# Patient Record
Sex: FEMALE | Race: WHITE | NOT HISPANIC OR LATINO | Employment: FULL TIME | URBAN - METROPOLITAN AREA
[De-identification: names, ages, dates, MRNs, and addresses within clinical notes are randomized per-mention and may not be internally consistent; named-entity substitution may affect disease eponyms.]

---

## 2017-01-06 ENCOUNTER — OFFICE VISIT (OUTPATIENT)
Dept: NEUROLOGY | Facility: CLINIC | Age: 37
End: 2017-01-06
Attending: NURSE PRACTITIONER
Payer: COMMERCIAL

## 2017-01-06 VITALS
BODY MASS INDEX: 26.31 KG/M2 | HEART RATE: 79 BPM | RESPIRATION RATE: 16 BRPM | OXYGEN SATURATION: 96 % | SYSTOLIC BLOOD PRESSURE: 86 MMHG | DIASTOLIC BLOOD PRESSURE: 60 MMHG | WEIGHT: 143 LBS | HEIGHT: 62 IN | TEMPERATURE: 97 F

## 2017-01-06 DIAGNOSIS — E56.9 VITAMIN DEFICIENCY: ICD-10-CM

## 2017-01-06 DIAGNOSIS — R42 DIZZINESS: Primary | ICD-10-CM

## 2017-01-06 LAB
DEPRECATED CALCIDIOL+CALCIFEROL SERPL-MC: 24 UG/L (ref 20–75)
VIT B12 SERPL-MCNC: 522 PG/ML (ref 193–986)

## 2017-01-06 PROCEDURE — 82607 VITAMIN B-12: CPT | Mod: 90 | Performed by: PSYCHIATRY & NEUROLOGY

## 2017-01-06 PROCEDURE — 83921 ORGANIC ACID SINGLE QUANT: CPT | Mod: 90 | Performed by: PSYCHIATRY & NEUROLOGY

## 2017-01-06 PROCEDURE — 99000 SPECIMEN HANDLING OFFICE-LAB: CPT | Performed by: PSYCHIATRY & NEUROLOGY

## 2017-01-06 PROCEDURE — 82306 VITAMIN D 25 HYDROXY: CPT | Mod: 90 | Performed by: PSYCHIATRY & NEUROLOGY

## 2017-01-06 PROCEDURE — 36415 COLL VENOUS BLD VENIPUNCTURE: CPT | Performed by: PSYCHIATRY & NEUROLOGY

## 2017-01-06 PROCEDURE — 99205 OFFICE O/P NEW HI 60 MIN: CPT | Performed by: PSYCHIATRY & NEUROLOGY

## 2017-01-06 NOTE — MR AVS SNAPSHOT
After Visit Summary   1/6/2017    Brianna Han    MRN: [id number]           Patient Information     Date Of Birth          1980        Visit Information        Provider Department      1/6/2017 8:00 AM Thang Emerson MD [facility]        Today's Diagnoses     Dizziness    -  1     Vitamin deficiency           Care Instructions    AFTER VISIT SUMMARY (AVS):    At today's visit we discussed various diagnostic possibilities for dizziness and the reasons for work-up, which includes:  Orders Placed This Encounter   Procedures     Vitamin B12     Vitamin D Deficiency     Methylmalonic acid     CARDIOLOGY EVAL ADULT REFERRAL     No new medications were ordered.    Preventive Neurology: Encouraged to stay physically and mentally active with particular emphasis on daily mentally stimulating activities of your choice (such as crosswords, puzzles, sudoku, etc.), stretching exercises, walking, and healthy eating.    Next follow-up appointment is on as needed basis.    Please do not hesitate to call me with any questions or concerns.    Thanks          Follow-ups after your visit        Additional Services     CARDIOLOGY EVAL ADULT REFERRAL       Your provider has referred you to:  [facility] [phone]   [email or web address]    Please be aware that coverage of these services is subject to the terms and limitations of your health insurance plan.  Call member services at your health plan with any benefit or coverage questions.      Type of Referral:  New Cardiology Consult. Family history of cardiac arrhthymias and episodic dizziness with running.     Timeframe requested:  Within 1 month    Please bring the following to your appointment:  >>   Any x-rays, CTs or MRIs which have been performed.  Contact the facility where they were done to arrange for  prior to your scheduled  "appointment.    >>   List of current medications  >>   This referral request   >>   Any documents/labs given to you for this referral                  Who to contact     If you have questions or need follow up information about today's clinic visit or your schedule please contact [facility] directly at [phone].  Normal or non-critical lab and imaging results will be communicated to you by MyChart, letter or phone within 4 business days after the clinic has received the results. If you do not hear from us within 7 days, please contact the clinic through [email or web address] or phone. If you have a critical or abnormal lab result, we will notify you by phone as soon as possible.  Submit refill requests through [employer] or call your pharmacy and they will forward the refill request to us. Please allow 3 business days for your refill to be completed.          Additional Information About Your Visit        MyChart Information     [employer] gives you secure access to your electronic health record. If you see a primary care provider, you can also send messages to your care team and make appointments. If you have questions, please call your primary care clinic.  If you do not have a primary care provider, please call [phone] and they will assist you.        Care EveryWhere ID     This is your Care EveryWhere ID. This could be used by other organizations to access your [facility] medical records  [id number]        Your Vitals Were     Pulse Temperature Respirations    79 97  F (36.1  C) (Tympanic) 16    Height BMI (Body Mass Index) Pulse Oximetry    1.57 m (5' 1.8\") 26.32 kg/m2 96%    Last Period          12/25/2016 (Exact Date)         Blood Pressure from Last 3 Encounters:   01/06/17 86/60   11/09/16 92/60   02/08/16 98/56    Weight from Last 3 Encounters:   01/06/17 64.864 kg (143 lb)   11/09/16 64.411 kg (142 lb)   02/08/16 65.499 kg (144 lb 6.4 oz)              We Performed the Following     CARDIOLOGY " [facility] ADULT REFERRAL     Methylmalonic acid     Vitamin B12     Vitamin D Deficiency        Primary Care Provider Office Phone # Fax #    LAYNE Johnson JORGE [phone]       [facility] [address] [place] [address]        Thank you!     Thank you for choosing [facility]  for your care. Our goal is always to provide you with excellent care. Hearing back from our patients is one way we can continue to improve our services. Please take a few minutes to complete the written survey that you may receive in the mail after your visit with us. Thank you!             Your Updated Medication List - Protect others around you: Learn how to safely use, store and throw away your medicines at www.disposemymeds.org.          This list is accurate as of: 1/6/17  9:14 AM.  Always use your most recent med list.                   Brand Name Dispense Instructions for use    levonorgestrel-ethinyl estradiol 0.1-20 MG-MCG per tablet    AVIANE,ALESSE,LESSINA    84 tablet    Take 1 tablet by mouth daily

## 2017-01-06 NOTE — PROGRESS NOTES
"INITIAL NEUROLOGY CONSULTATION    DATE OF VISIT: 1/6/2017  CLINIC LOCATION: [facility]  MRN: [id number]  PATIENT NAME: Brianna Han  YOB: 1980    REFERRING PROVIDER: Lisa Andrade APRN CNP    REASON FOR VISIT:   Chief Complaint   Patient presents with     Consult     dizziness while running       HISTORY OF PRESENT ILLNESS:                                                    Ms. Brianna Han is 36 year old left handed female patient who presents today with episodic dizziness triggered by running.    Per patient's report, she was in her usual state of health until November 2015 when between running 2 marathons and being under increased stress (lost her job), she had an episode of shingles. Since that time, noticed that every time she goes for a run, she gets dizzy after running for about 1-2 miles. It happens every time she runs even at slow or moderate pace. She described the sensation as \"feeling drunk\" and unable to keep upright position. In addition, she feels sick to her stomach and unsteady on her feet. The back of her head feels heavy. There is a sensation that she might fall (never actually occurred yet). With the symptoms she cannot continue running. After she stops running, symptoms go away almost immediately with mild lingering dizziness lasting 1-2 minutes. If she walks for 4-5 minutes, she is able to run again, though symptoms might return after running for 1-2 miles.    She has smartwatch that track her physical activity and pulse during the exercise. Her heart rate does not go above 165 beats per minute and averages at 148 beats per minute during the running (according to her watch tracing). Lately, she also noticed blurry and grainy vision when she looks down at the time of her dizziness. Symptoms do not occur outside of running. In fact, she also has regular aerobic exercises 2-3 times per week when her heart rate goes even higher, and her symptoms " do not occur with such physical activity. Occasionally, she might feel lightheaded when she does not eat or drink for the long time or hasn't slept well the night before, but it is mild and feels different.    She denies a history of recent head injury. Prior neurological history: negative for migraine headaches, stroke, brain neoplasms, seizure disorders, multiple sclerosis, major head injuries, CNS infections, and neurosurgical procedures. She had minor head injury with sled accident as a child, did not lose consciousness, and medical attention was not sought. 2 sisters have cardiac arrhythmias.    Neurologic Review of Systems - no amaurosis, diplopia, abnormal speech, unilateral numbness or weakness, changes in bowel or bladder functions. She endorses history of asthma (childhood), bleeding gums (periodically), earache (childhood), sinus problems, runny nose when cold, loss of libido, heartburn, constipation, nausea, vomiting, anxiety, depression, panic attacks, insomnia, headache (related to bruxism), fatigue, urinary tract infection (in the past), leg soreness (after prolonged running), fainting (childhood), tingling of hands, and skin rashes (in the past).  Otherwise, she denies any other complaints on 14-point comprehensive review of systems. She is vegetarian.    PAST MEDICAL/SURGICAL HISTORY:                                                    I personally reviewed patient's past medical and surgical history with the patient at today's visit.  Past Medical History   Diagnosis Date     Uncomplicated asthma      childhood     Depressive disorder      Past Surgical History   Procedure Laterality Date     C nonspecific procedure  06/2002     breast reduction     Breast surgery       Breast reduction 2002     Genitourinary surgery       childhood urethra surgery       MEDICATIONS:                                                    I personally reviewed patient's medications and allergies with the patient at  "today's visit.  Current Outpatient Prescriptions on File Prior to Visit:  levonorgestrel-ethinyl estradiol (AVIANE,ALESSE,LESSINA) 0.1-20 MG-MCG per tablet Take 1 tablet by mouth daily   acyclovir (ZOVIRAX) 800 MG tablet Take 1 tablet (800 mg) by mouth 5 times daily for 7 days     ALLERGIES:                                                      Allergies   Allergen Reactions     Iodine Hives     Latex        FAMILY/SOCIAL HISTORY:                                                    Family and social history was reviewed with the patient at today's visit. Mother had stroke, several family members (including her father, niece and nephew) have seizures. No other family history of neurological disorders.   Problem (# of Occurrences) Relation (Name,Age of Onset)    Alcohol/Drug (1) Brother    Anxiety Disorder (1) Sister    Breast Cancer (2) Maternal Grandmother, Mother    CANCER (1) Maternal Grandmother    CEREBROVASCULAR DISEASE (1) Mother    Depression (2) Mother, Sister        Single, lives with boyfriend, denies smoking and recreational drug use. She has occasional alcohol use (one beer in 1-2 months). Works full-time as a  for the last 10+ years.  Social History   Substance Use Topics     Smoking status: Never Smoker      Smokeless tobacco: Never Used     Alcohol Use: Yes     REVIEW OF SYSTEMS:                                                    Patient has completed a [facility] Patient Health History, including a 14-system review which was personally reviewed, and pertinent positives are listed in HPI. She denies any additional problems on the further questioning.    EXAM:                                                    VITAL SIGNS:   Vitals: BP 86/60 mmHg  Pulse 79  Temp(Src) 97  F (36.1  C) (Tympanic)  Resp 16  Ht 1.57 m (5' 1.8\")  Wt 64.864 kg (143 lb)  BMI 26.32 kg/m2  SpO2 96%  LMP 12/25/2016 (Exact Date)  BMI= Body mass index is 26.32 kg/(m^2).    General: pt is in NAD, " cooperative.  Skin: normal turgor, moist mucous membranes, no lesions/rashes noticed.  HEENT: ATNC, EOMI,PERRL, white sclera, normal conjunctiva, no nystagmus or ptosis. No carotid bruits bilaterally.  Respiratory: lung sounds clear to auscultation bilaterally, no crackles, wheezes, rhonchi. Symmetric lung excursion, no accessory respiratory muscle use.  Cardiovascular: normal S1/S2, no murmurs/rubs/gallops.  Abdomen: Not distended.  : deferred.    Neurological:  Mental: alert, oriented x 3, follows commands, 3/3 on memory recall, no aphasia or dysarthria. Fund of knowledge is appropriate for age.  Cranial Nerves:  CN II: Normal visual acuity. Visual fields intact, fundi: normal, discs sharp, no papilledema and normal vessels bilaterally.  CN III, IV, VI: EOM intact, pupils equal and reactive  CN V: facial sensation nl  CN VII: face symmetric, no facial droop  CN VIII: hearing normal. Lockwood-Hallpike maneuver was negative bilaterally. Patient reported subjective leg dizziness when test was performed on the right side. However, no nystagmus was seen.  CN IX: palate elevation symmetric, uvula at midline  CN XI SCM normal, shoulder shrug nl  CN XII: tongue midline, no fasciculations  Motor: Strength: 5/5 in all major groups of all extremities. Normal tone. No abnormal movements. No pronator drift b/l.  Reflexes: Triceps, biceps, brachioradialis, patellar, and achilles reflexes normal and symmetric. No clonus noted. Toes are down-going b/l.   Sensory: temperature, light touch, pinprick, and vibration intact. Romberg: negative.  Coordination: FNF and heel-shin tests intact b/l. No dysdiadochokinesia with rapid alternating movements.  Gait:  Normal, able tandem, toe, heel walk.    DATA:     LABS: I personally reviewed the following labs:  Component      Latest Ref Rng 2/8/2016 11/9/2016   WBC      4.0 - 11.0 10e9/L 7.4    RBC Count      3.8 - 5.2 10e12/L 4.24    Hemoglobin      11.7 - 15.7 g/dL 13.1 13.2   Hematocrit       35.0 - 47.0 % 39.7    MCV      78 - 100 fl 94    MCH      26.5 - 33.0 pg 30.9    MCHC      31.5 - 36.5 g/dL 33.0    RDW      10.0 - 15.0 % 11.5    Platelet Count      150 - 450 10e9/L 228    Diff Method       Automated Method    % Neutrophils       53.5    % Lymphocytes       39.2    % Monocytes       4.3    % Eosinophils       2.7    % Basophils       0.3    Absolute Neutrophil      1.6 - 8.3 10e9/L 3.9    Absolute Lymphocytes      0.8 - 5.3 10e9/L 2.9    Absolute Monocytes      0.0 - 1.3 10e9/L 0.3    Absolute Eosinophils      0.0 - 0.7 10e9/L 0.2    Absolute Basophils      0.0 - 0.2 10e9/L 0.0    Sodium      133 - 144 mmol/L 139    Potassium      3.4 - 5.3 mmol/L 4.3    Chloride      94 - 109 mmol/L 107    Carbon Dioxide      20 - 32 mmol/L 27    Anion Gap      3 - 14 mmol/L 5    Glucose      70 - 99 mg/dL 71 79   Urea Nitrogen      7 - 30 mg/dL 11    Creatinine      0.52 - 1.04 mg/dL 0.76    GFR Estimate      >60 mL/min/1.7m2 87    GFR Estimate If Black      >60 mL/min/1.7m2 >90 . . .    Calcium      8.5 - 10.1 mg/dL 9.2    Bilirubin Total      0.2 - 1.3 mg/dL 0.4    Albumin      3.4 - 5.0 g/dL 3.6    Protein Total      6.8 - 8.8 g/dL 7.3    Alkaline Phosphatase      40 - 150 U/L 55    ALT      0 - 50 U/L 17    AST      0 - 45 U/L 15    Cholesterol      <200 mg/dL  141   Triglycerides      <150 mg/dL  57   HDL Cholesterol      >49 mg/dL  57   LDL Cholesterol Calculated      <100 mg/dL  73   Non HDL Cholesterol      <130 mg/dL  84   TSH      0.40 - 4.00 mU/L 1.70      IMAGING: No recent brain imaging is available for review.   ASSESSMENT and PLAN:      ASSESSMENT: Brianna Han is a 36 year old left handed female patient who presents today with episodic dizziness triggered by running.    Her neurological exam is non-focal today. Bilateral Julian-Hallpike maneuver is negative. Recent labs were reviewed. No recent brain imaging is available for review.    Her clinical presentation includes the following  differential: presyncopal episodes related to orthostasis and/or dehydration versus symptoms related to paroxysmal cardiac arrhythmia given her family history. For this reason, I feel that cardiology evaluation with possible prolonged event monitoring is warranted. I will also add the several labs that might contribute to her fatigue and other symptoms. Other rare conditions, such as vestibular schwannoma or colloid cyst blocking the foramen of Monro, cannot be fully excluded but seem unlikely. If cardiology evaluation is not revealing, and/or her symptoms worsen, brain MRI with and without contrast might be considered.  DIAGNOSES:    ICD-10-CM    1. Dizziness R42 CARDIOLOGY EVAL ADULT REFERRAL   2. Vitamin deficiency E56.9 Vitamin B12     Vitamin D Deficiency     Methylmalonic acid       PLAN: At today's visit we discussed various diagnostic possibilities for dizziness and the reasons for work-up, which includes:  Orders Placed This Encounter   Procedures     Vitamin B12     Vitamin D Deficiency     Methylmalonic acid     CARDIOLOGY EVAL ADULT REFERRAL     No new medications were ordered.    Preventive Neurology: Encouraged to stay physically and mentally active with particular emphasis on daily mentally stimulating activities of your choice (such as crosswords, puzzles, sudoku, etc.), stretching exercises, walking, and healthy eating.    Next follow-up appointment is on as needed basis.    I encouraged the patient to call me with any questions or concerns.    Total Time:  60 minutes with > 50% spent counseling the patient on stated above assessment and recommendations, including nature of the diagnosis, needed w/u, proposed plan of treatment, and prognosis.    Thnag Emerson MD  / Neurology  [place]  (Chart documentation was completed in part with Dragon voice-recognition software. Even though reviewed, some grammatical, spelling, and word errors may remain.)

## 2017-01-06 NOTE — NURSING NOTE
"Recalled 3 words.    Chief Complaint   Patient presents with     Consult     dizziness while running       Initial BP 86/60 mmHg  Pulse 79  Temp(Src) 97  F (36.1  C) (Tympanic)  Resp 16  Ht 1.57 m (5' 1.8\")  Wt 64.864 kg (143 lb)  BMI 26.32 kg/m2  SpO2 96%  LMP 12/25/2016 (Exact Date) Estimated body mass index is 26.32 kg/(m^2) as calculated from the following:    Height as of this encounter: 1.57 m (5' 1.8\").    Weight as of this encounter: 64.864 kg (143 lb).  BP completed using cuff size: massiel Kang CMA      "

## 2017-01-06 NOTE — PATIENT INSTRUCTIONS
AFTER VISIT SUMMARY (AVS):    At today's visit we discussed various diagnostic possibilities for dizziness and the reasons for work-up, which includes:  Orders Placed This Encounter   Procedures     Vitamin B12     Vitamin D Deficiency     Methylmalonic acid     CARDIOLOGY EVAL ADULT REFERRAL     No new medications were ordered.    Preventive Neurology: Encouraged to stay physically and mentally active with particular emphasis on daily mentally stimulating activities of your choice (such as crosswords, puzzles, sudoku, etc.), stretching exercises, walking, and healthy eating.    Next follow-up appointment is on as needed basis.    Please do not hesitate to call me with any questions or concerns.    Thanks

## 2017-01-08 LAB — METHYLMALONATE SERPL-SCNC: 0.16

## 2017-01-31 ENCOUNTER — OFFICE VISIT (OUTPATIENT)
Dept: CARDIOLOGY | Facility: CLINIC | Age: 37
End: 2017-01-31
Attending: PSYCHIATRY & NEUROLOGY
Payer: COMMERCIAL

## 2017-01-31 VITALS
WEIGHT: 154 LBS | HEART RATE: 54 BPM | BODY MASS INDEX: 28.34 KG/M2 | SYSTOLIC BLOOD PRESSURE: 92 MMHG | OXYGEN SATURATION: 95 % | HEIGHT: 62 IN | DIASTOLIC BLOOD PRESSURE: 58 MMHG

## 2017-01-31 DIAGNOSIS — R42 DIZZINESS: Primary | ICD-10-CM

## 2017-01-31 PROCEDURE — 93000 ELECTROCARDIOGRAM COMPLETE: CPT | Performed by: INTERNAL MEDICINE

## 2017-01-31 PROCEDURE — 99204 OFFICE O/P NEW MOD 45 MIN: CPT | Mod: 25 | Performed by: INTERNAL MEDICINE

## 2017-01-31 NOTE — Clinical Note
1/31/2017    Thang Emerson MD  [facility]  [address] [place] [id number]    RE: Brianna Han       Dear Colleague,    I had the pleasure of seeing Brianna Han in the [facility].    CARDIOLOGY CONSULT    REASON FOR CONSULT: dizziness with exertion    PRIMARY CARE PHYSICIAN:  Lisa Andrade    HISTORY OF PRESENT ILLNESS:  36 year old female seen for evaluation of dizziness while running.  She is very healthy and active, she has no other medical issues.    She has been an avid runner since 2009, completing many marathons.  Over the past one to 2 years she has had dizziness while running.  About 1-3 miles into a run she will sometimes feel dizzy, almost like she is drunk.  She denies any palpitations, chest pain, lightheadedness, or syncope.  She will need to stop and walk for a short time, but then can resume running.  She's been running slightly slower recently, but thinks this is been more from an injury and less training.  She can still do runs up to 8 miles.     She started hydrating slightly more and thinks this has minimized her symptoms.  Historically blood pressure runs in the  systolic range.  She wears a heart rate monitor and average heart rate while running will be in the 140s, maximum up to 180.  Her resting heart rate is around 50.  She has not noticed any change in heart rate recently.     Her mother has a history of stroke and PFO.  She has a sister who seems to have inappropriate sinus tachycardia.  Otherwise no history of arrhythmias or sudden cardiac death.    PAST MEDICAL HISTORY:  Past Medical History   Diagnosis Date     Uncomplicated asthma      childhood     Depressive disorder        MEDICATIONS:  Current Outpatient Prescriptions   Medication     levonorgestrel-ethinyl estradiol (AVIANE,ALESSE,LESSINA) 0.1-20 MG-MCG per tablet     No current facility-administered medications for this visit.        ALLERGIES:  Allergies   Allergen Reactions     Iodine Hives     Latex        SOCIAL HISTORY:  I have reviewed this patient's social history and updated it with pertinent information if needed. Brianna Han  reports that she has never smoked. She has never used smokeless tobacco. She reports that she drinks alcohol. She reports that she does not use illicit drugs.    FAMILY HISTORY:  I have reviewed this patient's family history and updated it with pertinent information if needed.   Family History   Problem Relation Age of Onset     CANCER Maternal Grandmother      Breast Cancer Maternal Grandmother      Alcohol/Drug Brother      Breast Cancer Mother      Depression Mother      CEREBROVASCULAR DISEASE Mother      Depression Sister      Anxiety Disorder Sister        REVIEW OF SYSTEMS:  Constitutional:  No weight loss, fever, chills, weakness or fatigue.  HEENT:  Eyes:  No visual loss, blurred vision, double vision or yellow sclerae. No hearing loss, sneezing, congestion, runny nose or sore throat.  Skin:  No rash or itching.  Cardiovascular: per HPI  Respiratory: per HPI  GI:  No anorexia, nausea, vomiting or diarrhea. No abdominal pain or blood.  :  No dysurea, hematuria  Neurologic:  No headache, dizziness, syncope, paralysis, ataxia, numbness or tingling in the extremities. No change in bowel or bladder control.  Musculoskeletal:  No muscle, back pain, joint pain or stiffness.  Hematologic:  No anemia, bleeding or bruising.  Lymphatics:  No enlarged nodes. No history of splenectomy.  Psychiatric:  No history of depression or anxiety.  Endocrine:  No reports of sweating, cold or heat intolerance. No polyuria or polydipsia.  Allergies:  No history of asthma, hives, eczema or rhinitis.    PHYSICAL EXAM:      BP: 92/58 mmHg Pulse: 54     SpO2: 95 %      Vital Signs with Ranges  Pulse:  [54] 54  BP: (92)/(58) 92/58 mmHg  SpO2:  [95 %] 95 %  154 lbs 0 oz    Constitutional: awake, alert, no distress  Eyes:  PERRL, sclera nonicteric  ENT: trachea midline  Respiratory: Lungs clear  Cardiovascular: Regular rate and rhythm, no murmur  GI: nondistended, nontender, bowel sounds present  Lymph/Hematologic: no lymphadenopathy  Skin: dry, no rash  Musculoskeletal: good muscle tone, strength 5/5 in upper and lower extremities  Neurologic: no focal deficits  Neuropsychiatric: appropriate affact    DATA:  EKG: January 31, 2013: Sinus rhythm, normal axis and intervals, no preexcitation    ASSESSMENT:  36-year-old female seen for dizziness while running.  It is unlikely she is having any arrhythmia, as her heart rate seems very appropriate for her degree of activity.  Cardiac exam is normal.  EKG is normal.  This makes hypertrophic cardiomyopathy much less likely.     Of note her blood pressure runs borderline low at baseline.  Her symptoms seem somewhat from very mild global cerebral hypoperfusion which raises some suspicion for lower blood pressure and possibly some dehydration.  Interestingly her symptoms are less when she hydrates better.    A treadmill stress echo will be done with special attention to her blood pressure with exercise.  This will also rule out any exercise-induced arrhythmias.    RECOMMENDATIONS:  1.  Dizziness with running  - Treadmill stress echo with strict attention to blood pressure with exertion    Follow-up as needed with any abnormalities.    Hugo Kenny MD  Cardiology - [facility]  Pager:  [phone]  Text Page  January 31, 2017    Thank you for allowing me to participate in the care of your patient.    Sincerely,     Hugo Kenny MD     [facility]    cc:   Lisa Andrade, LAYNE CNP  [facility] [address] [place] [address]

## 2017-01-31 NOTE — MR AVS SNAPSHOT
After Visit Summary   1/31/2017    Brianna Han    MRN: [id number]           Patient Information     Date Of Birth          1980        Visit Information        Provider Department      1/31/2017 4:15 PM Hugo Kenny MD [facility] HE[facility] AT [place]        Today's Diagnoses     Dizziness    -  1        Follow-ups after your visit        Your next 10 appointments already scheduled     Feb 10, 2017  1:00 PM   Ech Stress Test with RHSTRESS   [facility]    [address] [facility] [id number] [phone]           1.  Please bring or wear a comfortable two-piece outfit and walking shoes. 2.  Stop eating 3 hours before the test. You may drink water or juice. 3.  Stop all caffeine 12 hours before the test. This includes coffee, tea, soda pop, chocolate and certain medicines (such as Anacin and Excederin). Also avoid decaf coffee and tea, as these contain small amounts of caffeine. 4.  No alcohol, smoking or use of other tobacco products for 12 hours before the test. 5.  Refer to your provider instructions to see if you need to stop any medications (such as beta-blockers or nitrates) for this test. 6.  For patients with diabetes: -   If you take insulin, call your diabetes care team. Ask if you should take a   dose the morning of your test. -   If you take diabetes medicine by mouth, don't take it on the morning of your test. Bring it with you to take after the test.  (If you have questions, call your diabetes care team) 7.  When you arrive, please tell us if: -   You have diabetes. -   You have taken Viagra, Cialis or Levitra in the past 48 hours. 8.  For any questions that cannot be answered, please contact the ordering physician              Future tests that were ordered for you today     Open Future Orders        Priority Expected Expires Ordered    Exercise Stress Echocardiogram Routine 2/7/2017  "1/31/2018 1/31/2017            Who to contact     If you have questions or need follow up information about today's clinic visit or your schedule please contact [facility] [place] directly at [phone].  Normal or non-critical lab and imaging results will be communicated to you by MyChart, letter or phone within 4 business days after the clinic has received the results. If you do not hear from us within 7 days, please contact the clinic through [employer] or phone. If you have a critical or abnormal lab result, we will notify you by phone as soon as possible.  Submit refill requests through [employer] or call your pharmacy and they will forward the refill request to us. Please allow 3 business days for your refill to be completed.          Additional Information About Your Visit        [employer] Information     [employer] gives you secure access to your electronic health record. If you see a primary care provider, you can also send messages to your care team and make appointments. If you have questions, please call your primary care clinic.  If you do not have a primary care provider, please call [phone] and they will assist you.        Care EveryWhere ID     This is your Care EveryWhere ID. This could be used by other organizations to access your [place] medical records  [id number]        Your Vitals Were     Pulse Height BMI (Body Mass Index) Pulse Oximetry Last Period       54 1.568 m (5' 1.75\") 28.41 kg/m2 95% 12/25/2016 (Exact Date)        Blood Pressure from Last 3 Encounters:   01/31/17 92/58   01/06/17 86/60   11/09/16 92/60    Weight from Last 3 Encounters:   01/31/17 69.854 kg (154 lb)   01/06/17 64.864 kg (143 lb)   11/09/16 64.411 kg (142 lb)              We Performed the Following     EKG 12-lead complete w/read - Clinics (performed today)        Primary Care Provider Office Phone # Fax #    LAYNE Johnson [phone]       [place] [facility] [address] [address]  [place] [address]        Thank you!     Thank you for choosing [facility] [place]  for your care. Our goal is always to provide you with excellent care. Hearing back from our patients is one way we can continue to improve our services. Please take a few minutes to complete the written survey that you may receive in the mail after your visit with us. Thank you!             Your Updated Medication List - Protect others around you: Learn how to safely use, store and throw away your medicines at www.disposemymeds.org.          This list is accurate as of: 1/31/17  5:00 PM.  Always use your most recent med list.                   Brand Name Dispense Instructions for use    levonorgestrel-ethinyl estradiol 0.1-20 MG-MCG per tablet    AVIANE,ALESSE,LESSINA    84 tablet    Take 1 tablet by mouth daily

## 2017-01-31 NOTE — PROGRESS NOTES
CARDIOLOGY CONSULT    REASON FOR CONSULT: dizziness with exertion    PRIMARY CARE PHYSICIAN:  Lisa Andrade    HISTORY OF PRESENT ILLNESS:  36 year old female seen for evaluation of dizziness while running.  She is very healthy and active, she has no other medical issues.    She has been an avid runner since 2009, completing many marathons.  Over the past one to 2 years she has had dizziness while running.  About 1-3 miles into a run she will sometimes feel dizzy, almost like she is drunk.  She denies any palpitations, chest pain, lightheadedness, or syncope.  She will need to stop and walk for a short time, but then can resume running.  She's been running slightly slower recently, but thinks this is been more from an injury and less training.  She can still do runs up to 8 miles.     She started hydrating slightly more and thinks this has minimized her symptoms.  Historically blood pressure runs in the  systolic range.  She wears a heart rate monitor and average heart rate while running will be in the 140s, maximum up to 180.  Her resting heart rate is around 50.  She has not noticed any change in heart rate recently.     Her mother has a history of stroke and PFO.  She has a sister who seems to have inappropriate sinus tachycardia.  Otherwise no history of arrhythmias or sudden cardiac death.    PAST MEDICAL HISTORY:  Past Medical History   Diagnosis Date     Uncomplicated asthma      childhood     Depressive disorder        MEDICATIONS:  Current Outpatient Prescriptions   Medication     levonorgestrel-ethinyl estradiol (AVIANE,ALESSE,LESSINA) 0.1-20 MG-MCG per tablet     No current facility-administered medications for this visit.       ALLERGIES:  Allergies   Allergen Reactions     Iodine Hives     Latex        SOCIAL HISTORY:  I have reviewed this patient's social history and updated it with pertinent information if needed. Brianna Han  reports that she has never smoked. She has never used  smokeless tobacco. She reports that she drinks alcohol. She reports that she does not use illicit drugs.    FAMILY HISTORY:  I have reviewed this patient's family history and updated it with pertinent information if needed.   Family History   Problem Relation Age of Onset     CANCER Maternal Grandmother      Breast Cancer Maternal Grandmother      Alcohol/Drug Brother      Breast Cancer Mother      Depression Mother      CEREBROVASCULAR DISEASE Mother      Depression Sister      Anxiety Disorder Sister        REVIEW OF SYSTEMS:  Constitutional:  No weight loss, fever, chills, weakness or fatigue.  HEENT:  Eyes:  No visual loss, blurred vision, double vision or yellow sclerae. No hearing loss, sneezing, congestion, runny nose or sore throat.  Skin:  No rash or itching.  Cardiovascular: per HPI  Respiratory: per HPI  GI:  No anorexia, nausea, vomiting or diarrhea. No abdominal pain or blood.  :  No dysurea, hematuria  Neurologic:  No headache, dizziness, syncope, paralysis, ataxia, numbness or tingling in the extremities. No change in bowel or bladder control.  Musculoskeletal:  No muscle, back pain, joint pain or stiffness.  Hematologic:  No anemia, bleeding or bruising.  Lymphatics:  No enlarged nodes. No history of splenectomy.  Psychiatric:  No history of depression or anxiety.  Endocrine:  No reports of sweating, cold or heat intolerance. No polyuria or polydipsia.  Allergies:  No history of asthma, hives, eczema or rhinitis.    PHYSICAL EXAM:      BP: 92/58 mmHg Pulse: 54     SpO2: 95 %      Vital Signs with Ranges  Pulse:  [54] 54  BP: (92)/(58) 92/58 mmHg  SpO2:  [95 %] 95 %  154 lbs 0 oz    Constitutional: awake, alert, no distress  Eyes: PERRL, sclera nonicteric  ENT: trachea midline  Respiratory: Lungs clear  Cardiovascular: Regular rate and rhythm, no murmur  GI: nondistended, nontender, bowel sounds present  Lymph/Hematologic: no lymphadenopathy  Skin: dry, no rash  Musculoskeletal: good muscle tone,  strength 5/5 in upper and lower extremities  Neurologic: no focal deficits  Neuropsychiatric: appropriate affact    DATA:  EKG: January 31, 2013: Sinus rhythm, normal axis and intervals, no preexcitation    ASSESSMENT:  36-year-old female seen for dizziness while running.  It is unlikely she is having any arrhythmia, as her heart rate seems very appropriate for her degree of activity.  Cardiac exam is normal.  EKG is normal.  This makes hypertrophic cardiomyopathy much less likely.     Of note her blood pressure runs borderline low at baseline.  Her symptoms seem somewhat from very mild global cerebral hypoperfusion which raises some suspicion for lower blood pressure and possibly some dehydration.  Interestingly her symptoms are less when she hydrates better.    A treadmill stress echo will be done with special attention to her blood pressure with exercise.  This will also rule out any exercise-induced arrhythmias.    RECOMMENDATIONS:  1.  Dizziness with running  - Treadmill stress echo with strict attention to blood pressure with exertion    Follow-up as needed with any abnormalities.    Hugo Kenny MD  Cardiology - [facility]  Pager:  [phone]  Text Page  January 31, 2017

## 2017-02-10 ENCOUNTER — HOSPITAL ENCOUNTER (OUTPATIENT)
Dept: CARDIOLOGY | Facility: CLINIC | Age: 37
Discharge: HOME | End: 2017-02-10
Attending: INTERNAL MEDICINE | Admitting: INTERNAL MEDICINE
Payer: COMMERCIAL

## 2017-02-10 DIAGNOSIS — R42 DIZZINESS: ICD-10-CM

## 2017-02-10 PROCEDURE — 93350 STRESS TTE ONLY: CPT | Mod: 26 | Performed by: INTERNAL MEDICINE

## 2017-02-10 PROCEDURE — 93321 DOPPLER ECHO F-UP/LMTD STD: CPT | Mod: 26 | Performed by: INTERNAL MEDICINE

## 2017-02-10 PROCEDURE — 93350 STRESS TTE ONLY: CPT | Mod: TC

## 2017-02-10 PROCEDURE — 93325 DOPPLER ECHO COLOR FLOW MAPG: CPT | Mod: 26 | Performed by: INTERNAL MEDICINE

## 2017-02-10 PROCEDURE — 93018 CV STRESS TEST I&R ONLY: CPT | Performed by: INTERNAL MEDICINE

## 2017-02-10 PROCEDURE — 93016 CV STRESS TEST SUPVJ ONLY: CPT | Performed by: INTERNAL MEDICINE

## 2017-02-13 ENCOUNTER — TELEPHONE (OUTPATIENT)
Dept: CARDIOLOGY | Facility: CLINIC | Age: 37
End: 2017-02-13

## 2017-02-13 NOTE — TELEPHONE ENCOUNTER
"Called pt and advised per Dr. Kenny \"stress test was normal.  BP was good throughout test.  No further cardiac testing needed\". Advised to f/u with PMD. Pt understood.   Santo VILLARREAL    "

## 2017-03-23 ENCOUNTER — MYC MEDICAL ADVICE (OUTPATIENT)
Dept: FAMILY MEDICINE | Facility: CLINIC | Age: 37
End: 2017-03-23

## 2017-03-23 DIAGNOSIS — Z11.3 SCREEN FOR STD (SEXUALLY TRANSMITTED DISEASE): Primary | ICD-10-CM

## 2017-04-24 ENCOUNTER — OFFICE VISIT (OUTPATIENT)
Dept: FAMILY MEDICINE | Facility: CLINIC | Age: 37
End: 2017-04-24
Payer: COMMERCIAL

## 2017-04-24 ENCOUNTER — TELEPHONE (OUTPATIENT)
Dept: NURSING | Facility: CLINIC | Age: 37
End: 2017-04-24

## 2017-04-24 VITALS
TEMPERATURE: 97.6 F | BODY MASS INDEX: 26.07 KG/M2 | SYSTOLIC BLOOD PRESSURE: 95 MMHG | HEART RATE: 55 BPM | DIASTOLIC BLOOD PRESSURE: 64 MMHG | RESPIRATION RATE: 18 BRPM | WEIGHT: 141.38 LBS

## 2017-04-24 DIAGNOSIS — N39.0 URINARY TRACT INFECTION WITHOUT HEMATURIA, SITE UNSPECIFIED: Primary | ICD-10-CM

## 2017-04-24 LAB
ALBUMIN UR-MCNC: NEGATIVE MG/DL
APPEARANCE UR: CLEAR
BACTERIA #/AREA URNS HPF: ABNORMAL /HPF
BILIRUB UR QL STRIP: NEGATIVE
COLOR UR AUTO: YELLOW
GLUCOSE UR STRIP-MCNC: NEGATIVE MG/DL
HGB UR QL STRIP: ABNORMAL
KETONES UR STRIP-MCNC: NEGATIVE MG/DL
LEUKOCYTE ESTERASE UR QL STRIP: ABNORMAL
NITRATE UR QL: POSITIVE
NON-SQ EPI CELLS #/AREA URNS LPF: ABNORMAL /LPF
PH UR STRIP: 7.5 PH (ref 5–7)
RBC #/AREA URNS AUTO: ABNORMAL /HPF (ref 0–2)
SP GR UR STRIP: 1.01 (ref 1–1.03)
URN SPEC COLLECT METH UR: ABNORMAL
UROBILINOGEN UR STRIP-ACNC: 1 EU/DL (ref 0.2–1)
WBC #/AREA URNS AUTO: ABNORMAL /HPF (ref 0–2)

## 2017-04-24 PROCEDURE — 81001 URINALYSIS AUTO W/SCOPE: CPT | Performed by: NURSE PRACTITIONER

## 2017-04-24 PROCEDURE — 99213 OFFICE O/P EST LOW 20 MIN: CPT | Performed by: NURSE PRACTITIONER

## 2017-04-24 RX ORDER — SULFAMETHOXAZOLE/TRIMETHOPRIM 800-160 MG
1 TABLET ORAL 2 TIMES DAILY
Qty: 6 TABLET | Refills: 0 | Status: SHIPPED | OUTPATIENT
Start: 2017-04-24 | End: 2017-04-27

## 2017-04-24 NOTE — NURSING NOTE
"Chief Complaint   Patient presents with     UTI       Initial BP 95/64  Pulse 55  Temp 97.6  F (36.4  C) (Oral)  Resp 18  Wt 141 lb 6 oz (64.1 kg)  BMI 26.07 kg/m2 Estimated body mass index is 26.07 kg/(m^2) as calculated from the following:    Height as of 1/31/17: 5' 1.75\" (1.568 m).    Weight as of this encounter: 141 lb 6 oz (64.1 kg).  Medication Reconciliation: complete       Theresa Hooks MA      "

## 2017-04-24 NOTE — TELEPHONE ENCOUNTER
"Call Type: Triage Call    Presenting Problem: \"Started new relationship\" has UTI symptoms.  Triage Note:  Guideline Title: Urinary Symptoms - Female  Recommended Disposition: See Provider within 24 hours  Original Inclination: Did not know what to do  Override Disposition:  Intended Action: Call PCP/HCP  Physician Contacted: No  Has one or more urinary tract symptoms AND has not been previously evaluated ?  YES  Blood in urine ? NO  Abnormal vaginal discharge (such as increased quantity, abnormal color,  consistency, odor) ? NO  Flank pain ? NO  New or worsening signs and symptoms that may indicate shock ? NO  Any temperature elevation in a frail elderly, immunocompromised or pregnant person  ? NO  Unbearable abdominal/pelvic pain ? NO  Current or recent urinary tract instrumentation AND urinary tract symptoms OR no  urine flow ? NO  Urinary tract symptoms AND any flank or low back pain ? NO  Urinary tract symptoms AND fever 101.5 F (38.6 C) or higher or vomiting ? NO  No urination for 12 or more hours ? NO  Any other unexpected urinary symptoms following urinary tract or abdominal surgery  within timeframe specified by provider ? NO  Physician Instructions:  Care Advice: SYMPTOM / CONDITION MANAGEMENT  "

## 2017-04-24 NOTE — MR AVS SNAPSHOT
After Visit Summary   4/24/2017    Brianna Han    MRN: [id number]           Patient Information     Date Of Birth          1980        Visit Information        Provider Department      4/24/2017 12:45 PM Brianna Washington NP [facility]        Today's Diagnoses     Urinary tract infection without hematuria, site unspecified    -  1       Follow-ups after your visit        Who to contact     If you have questions or need follow up information about today's clinic visit or your schedule please contact [facility] directly at [phone].  Normal or non-critical lab and imaging results will be communicated to you by [employer], letter or phone within 4 business days after the clinic has received the results. If you do not hear from us within 7 days, please contact the clinic through [employer] or phone. If you have a critical or abnormal lab result, we will notify you by phone as soon as possible.  Submit refill requests through [employer] or call your pharmacy and they will forward the refill request to us. Please allow 3 business days for your refill to be completed.          Additional Information About Your Visit        MyChart Information     [employer] gives you secure access to your electronic health record. If you see a primary care provider, you can also send messages to your care team and make appointments. If you have questions, please call your primary care clinic.  If you do not have a primary care provider, please call [phone] and they will assist you.        Care EveryWhere ID     This is your Care EveryWhere ID. This could be used by other organizations to access your [place] medical records  [id number]        Your Vitals Were     Pulse Temperature Respirations BMI (Body Mass Index)          55 97.6  F (36.4  C) (Oral) 18 26.07 kg/m2         Blood Pressure from Last 3 Encounters:   04/24/17 95/64   01/31/17 92/58   01/06/17 (!) 86/60    Weight  from Last 3 Encounters:   04/24/17 141 lb 6 oz (64.1 kg)   01/31/17 154 lb (69.9 kg)   01/06/17 143 lb (64.9 kg)              We Performed the Following     UA reflex to Microscopic     Urine Microscopic          Today's Medication Changes          These changes are accurate as of: 4/24/17  1:42 PM.  If you have any questions, ask your nurse or doctor.               Start taking these medicines.        Dose/Directions    sulfamethoxazole-trimethoprim 800-160 MG per tablet   Commonly known as:  BACTRIM DS/SEPTRA DS   Used for:  Urinary tract infection without hematuria, site unspecified   Started by:  Brianna Washington, NP        Dose:  1 tablet   Take 1 tablet by mouth 2 times daily for 3 days   Quantity:  6 tablet   Refills:  0            Where to get your medicines      These medications were sent to [facility] - [place], [place] - [address], [place] [address]     Phone:  [phone]     sulfamethoxazole-trimethoprim 800-160 MG per tablet                Primary Care Provider Office Phone # Fax #    LAYNE Johnson [phone]       [facility] [address] [place] [address]        Thank you!     Thank you for choosing [facility]  for your care. Our goal is always to provide you with excellent care. Hearing back from our patients is one way we can continue to improve our services. Please take a few minutes to complete the written survey that you may receive in the mail after your visit with us. Thank you!             Your Updated Medication List - Protect others around you: Learn how to safely use, store and throw away your medicines at www.disposemymeds.org.          This list is accurate as of: 4/24/17  1:42 PM.  Always use your most recent med list.                   Brand Name Dispense Instructions for use    levonorgestrel-ethinyl estradiol 0.1-20 MG-MCG per tablet    AVIANE,ALESSE,LESSINA     84 tablet    Take 1 tablet by mouth daily       sulfamethoxazole-trimethoprim 800-160 MG per tablet    BACTRIM DS/SEPTRA DS    6 tablet    Take 1 tablet by mouth 2 times daily for 3 days

## 2017-04-24 NOTE — PROGRESS NOTES
"  SUBJECTIVE:                                                    Brianna Han is a 36 year old female who presents to clinic today for the following health issues:      URINARY TRACT SYMPTOMS      Duration: a couple days and worse this morning     Description  Dysuria, frequency and urgency    Intensity:  moderate    Accompanying signs and symptoms:  Fever/chills: no   Flank pain no   Nausea and vomiting: no, \"feeling down low energy\".   Vaginal symptoms: none  Abdominal/Pelvic Pain: no     History  History of frequent UTI's: YES, surgery to widen urethra in the past  History of kidney stones: no   Sexually Active: YES, she is sexually active again   Possibility of pregnancy: No    Precipitating or alleviating factors: None    Therapies tried and outcome: OTC Urinary pain relief Phenazopyridine Outcome: helps temporarily for pain, relieves worst of the pain            Problem list and histories reviewed & adjusted, as indicated.  Additional history: as documented        Reviewed and updated as needed this visit by clinical staff       Reviewed and updated as needed this visit by Provider             OBJECTIVE:                                                    BP 95/64  Pulse 55  Temp 97.6  F (36.4  C) (Oral)  Resp 18  Wt 141 lb 6 oz (64.1 kg)  BMI 26.07 kg/m2  Body mass index is 26.07 kg/(m^2).  GENERAL: healthy, alert and no distress  RESP: lungs clear to auscultation - no rales, rhonchi or wheezes  CV: regular rate and rhythm, normal S1 S2, no S3 or S4, no murmur, click or rub, no peripheral edema and peripheral pulses strong  ABDOMEN: soft, nontender, no hepatosplenomegaly, no masses and bowel sounds normal  MS: no gross musculoskeletal defects noted, no edema    Diagnostic Test Results:  Results for orders placed or performed in visit on 04/24/17 (from the past 24 hour(s))   UA reflex to Microscopic   Result Value Ref Range    Color Urine Yellow     Appearance Urine Clear     Glucose Urine Negative NEG " mg/dL    Bilirubin Urine Negative NEG    Ketones Urine Negative NEG mg/dL    Specific Gravity Urine 1.015 1.003 - 1.035    Blood Urine Trace (A) NEG    pH Urine 7.5 (H) 5.0 - 7.0 pH    Protein Albumin Urine Negative NEG mg/dL    Urobilinogen Urine 1.0 0.2 - 1.0 EU/dL    Nitrite Urine Positive (A) NEG    Leukocyte Esterase Urine Small (A) NEG    Source Midstream Urine    Urine Microscopic   Result Value Ref Range    WBC Urine 25-50 (A) 0 - 2 /HPF    RBC Urine 2-5 (A) 0 - 2 /HPF    Squamous Epithelial /LPF Urine Moderate (A) FEW /LPF    Bacteria Urine Few (A) NEG /HPF        ASSESSMENT/PLAN:                                                            1. Urinary tract infection without hematuria, site unspecified  Increase fluids.  Discussed the use and indication of this medication as well as potential side effects.   - UA reflex to Microscopic  - Urine Microscopic  - sulfamethoxazole-trimethoprim (BACTRIM DS/SEPTRA DS) 800-160 MG per tablet; Take 1 tablet by mouth 2 times daily for 3 days  Dispense: 6 tablet; Refill: 0        Brianna Washington NP  [facility]

## 2017-06-21 ENCOUNTER — OFFICE VISIT (OUTPATIENT)
Dept: FAMILY MEDICINE | Facility: CLINIC | Age: 37
End: 2017-06-21
Payer: COMMERCIAL

## 2017-06-21 VITALS
WEIGHT: 143 LBS | RESPIRATION RATE: 16 BRPM | TEMPERATURE: 98.1 F | SYSTOLIC BLOOD PRESSURE: 104 MMHG | DIASTOLIC BLOOD PRESSURE: 68 MMHG | HEART RATE: 61 BPM | OXYGEN SATURATION: 95 % | BODY MASS INDEX: 26.37 KG/M2

## 2017-06-21 DIAGNOSIS — N89.8 VAGINAL ITCHING: Primary | ICD-10-CM

## 2017-06-21 DIAGNOSIS — B96.89 BV (BACTERIAL VAGINOSIS): ICD-10-CM

## 2017-06-21 DIAGNOSIS — N76.0 BV (BACTERIAL VAGINOSIS): ICD-10-CM

## 2017-06-21 DIAGNOSIS — Z11.3 SCREEN FOR STD (SEXUALLY TRANSMITTED DISEASE): ICD-10-CM

## 2017-06-21 LAB
MICRO REPORT STATUS: ABNORMAL
SPECIMEN SOURCE: ABNORMAL
WET PREP SPEC: ABNORMAL

## 2017-06-21 PROCEDURE — 87210 SMEAR WET MOUNT SALINE/INK: CPT | Performed by: FAMILY MEDICINE

## 2017-06-21 PROCEDURE — 36415 COLL VENOUS BLD VENIPUNCTURE: CPT | Performed by: FAMILY MEDICINE

## 2017-06-21 PROCEDURE — 87591 N.GONORRHOEAE DNA AMP PROB: CPT | Performed by: FAMILY MEDICINE

## 2017-06-21 PROCEDURE — 87389 HIV-1 AG W/HIV-1&-2 AB AG IA: CPT | Performed by: FAMILY MEDICINE

## 2017-06-21 PROCEDURE — 99213 OFFICE O/P EST LOW 20 MIN: CPT | Performed by: FAMILY MEDICINE

## 2017-06-21 PROCEDURE — 87491 CHLMYD TRACH DNA AMP PROBE: CPT | Performed by: FAMILY MEDICINE

## 2017-06-21 PROCEDURE — 86780 TREPONEMA PALLIDUM: CPT | Performed by: FAMILY MEDICINE

## 2017-06-21 RX ORDER — METRONIDAZOLE 500 MG/1
500 TABLET ORAL 2 TIMES DAILY
Qty: 14 TABLET | Refills: 0 | Status: SHIPPED | OUTPATIENT
Start: 2017-06-21 | End: 2018-05-22

## 2017-06-21 NOTE — PROGRESS NOTES
HPI  CC:  37 yo F presents with vaginal itching and bloody discharge.    Vaginal Symptoms      Duration: Monday     Description  vaginal discharge - pinkish and itching. Irritation, pt state it hurts     Intensity:  mild    Accompanying signs and symptoms (fever/dysuria/abdominal or back pain): None    History  Sexually active: yes, new partner, contraception - pill  Possibility of pregnancy: Yes  Recent antibiotic use: YES- it's been 1 month since pt had antibiotic. Pt took 3 days antibiotic and doesn't seem to help.     Precipitating or alleviating factors: None    Therapies tried and outcome: Antibiotic   Outcome: not effective      3 days of vaginal itching, thought likely to be yeast infection so she tried the three day over the counter monistat; however, it did not help and seems to have made the discomfort worse.  She has some burning pain.  She has also seen some pink discharge that otherwise looks normal.  There is a slight fishy odor.  It seems more itchy than a typical yeast infection.    She is sexually active and has had <10 partners lifetime.  She has had only one new partner in the past year (April); has HPV.  They have used condoms regularly.    No fevers, chills, dysuria, frequency, abdominal pain, or flank pain.      Review of Systems   Constitutional: Negative for chills, fever and malaise/fatigue.   Gastrointestinal: Negative for abdominal pain, nausea and vomiting.   Genitourinary: Negative for dysuria, flank pain, frequency, hematuria and urgency.   Skin: Negative for rash.       Allergies   Allergen Reactions     Iodine Hives     Latex      Current Outpatient Prescriptions   Medication     metroNIDAZOLE (FLAGYL) 500 MG tablet     levonorgestrel-ethinyl estradiol (AVIANE,ALESSE,LESSINA) 0.1-20 MG-MCG per tablet     No current facility-administered medications for this visit.      Active Ambulatory Problems     Diagnosis Date Noted     CARDIOVASCULAR SCREENING; LDL GOAL LESS THAN 160 10/31/2010      IBS (irritable bowel syndrome) 11/01/2010     Encounter for other general counseling or advice on contraception 12/10/2010     Nonallopathic lesion of sacral region 03/07/2011     Nonallopathic lesion of lower extremities 03/07/2011     Pain in joint, lower leg 03/07/2011     Family circumstance 09/21/2011     Resolved Ambulatory Problems     Diagnosis Date Noted     No Resolved Ambulatory Problems     Past Medical History:   Diagnosis Date     Depressive disorder      Uncomplicated asthma          Physical Exam   Constitutional: She is well-developed, well-nourished, and in no distress.   HENT:   Nose: Nose normal.   Mouth/Throat: Oropharynx is clear and moist. No oropharyngeal exudate.   Eyes: Conjunctivae are normal. Pupils are equal, round, and reactive to light. Right eye exhibits no discharge. Left eye exhibits no discharge. No scleral icterus.   Neck: Neck supple.   Cardiovascular: Normal rate, regular rhythm, normal heart sounds and intact distal pulses.  Exam reveals no gallop and no friction rub.    No murmur heard.  Pulmonary/Chest: Effort normal and breath sounds normal. No respiratory distress. She has no wheezes. She has no rales.   Abdominal: Soft. Bowel sounds are normal. She exhibits no distension. There is no tenderness. There is no rebound and no guarding.   Lymphadenopathy:     She has no cervical adenopathy.   Neurological: She is alert.   Skin: Skin is warm and dry. She is not diaphoretic.   Psychiatric: Affect normal.   Vitals reviewed.    /68 (BP Location: Right arm, Patient Position: Chair, Cuff Size: Adult Regular)  Pulse 61  Temp 98.1  F (36.7  C) (Tympanic)  Resp 16  Wt 143 lb (64.9 kg)  SpO2 95%  BMI 26.37 kg/m2    AP  Vaginal pruritis and bloody discharge:  With clue cells on wet prep, will treat for BV, which may account for her symptoms.  The topical yeast medication may have also caused some local irritation that is contributing.  Discussed other STI screening that is  indicated, GC/CT, HIV and RPR, and patient is agreeable.  Advised regular condom use.

## 2017-06-21 NOTE — MR AVS SNAPSHOT
After Visit Summary   2017    Brianna Han    MRN: [id number]           Patient Information     Date Of Birth          1980        Visit Information        Provider Department      2017 2:40 PM Melissa Marroquin MD [facility]        Today's Diagnoses     Vaginal itching    -  1    Screen for STD (sexually transmitted disease)        BV (bacterial vaginosis)          Care Instructions      Bacterial Vaginosis    You have a vaginal infection called bacterial vaginosis (BV). Both good and bad bacteria are present in a healthy vagina. BV occurs when these bacteria get out of balance. The number of bad bacteria increase. And the number of good bacteria decrease.  BV may or may not cause symptoms. If symptoms do occur, they can include:    Thin, gray, milky-white, or sometimes green discharge    Unpleasant odor or  fishy  smell    Itching, burning, or pain in or around the vagina  It is not known what causes BV, but certain factors can make the problem more likely. This can include:    Douching    Having sex with a new partner    Having sex with more than one partner  BV will sometimes go away on its own. But treatment is usually recommended. This is because untreated BV can increase the risk of more serious health problems such as:    Pelvic inflammatory disease (PID)     delivery (giving birth to a baby early if you re pregnant)    HIV and certain other sexually transmitted diseases (STDs)    Infection after surgery on the reproductive organs  Home care  General care    BV is most often treated with medicines called antibiotics. These may be given as pills or as a vaginal cream. If antibiotics are prescribed, be sure to use them exactly as directed. Also, be sure to complete all of the medicine, even if your symptoms go away.    Avoid douching or having sex during treatment.    If you have sex with a female partner, ask your healthcare provider if she should  also be treated.  Prevention    Limit or avoid douching.    Avoid having sex. If you do have sex, then take steps to lower your risk:    Use condoms when having sex.    Limit the number of partners you have sex with.  Follow-up care  Follow up with your healthcare provider, or as advised.  When to seek medical advice  Call your healthcare provider right away if:    You have a fever of 100.4 F (38 C) or higher, or as directed by your provider.    Your symptoms worsen, or they don t go away within a few days of starting treatment.    You have new pain in the lower belly or pelvic region.    You have side effects that bother you or a reaction to the pills or cream you re prescribed.    You or any partners you have sex with have new symptoms, such as a rash, joint pain, or sores.  Date Last Reviewed: 7/30/2015 2000-2017 [employer]. [address] [place] [address]. All rights reserved. This information is not intended as a substitute for professional medical care. Always follow your healthcare professional's instructions.                Follow-ups after your visit        Who to contact     If you have questions or need follow up information about today's clinic visit or your schedule please contact [facility] directly at [phone].  Normal or non-critical lab and imaging results will be communicated to you by [employer], letter or phone within 4 business days after the clinic has received the results. If you do not hear from us within 7 days, please contact the clinic through [employer] or phone. If you have a critical or abnormal lab result, we will notify you by phone as soon as possible.  Submit refill requests through [employer] or call your pharmacy and they will forward the refill request to us. Please allow 3 business days for your refill to be completed.          Additional Information About Your Visit        [employer] Information     [employer] gives you secure access to your  electronic health record. If you see a primary care provider, you can also send messages to your care team and make appointments. If you have questions, please call your primary care clinic.  If you do not have a primary care provider, please call [phone] and they will assist you.        Care EveryWhere ID     This is your Care EveryWhere ID. This could be used by other organizations to access your [facility] medical records  [id number]        Your Vitals Were     Pulse Temperature Respirations Pulse Oximetry BMI (Body Mass Index)       61 98.1  F (36.7  C) (Tympanic) 16 95% 26.37 kg/m2        Blood Pressure from Last 3 Encounters:   06/21/17 104/68   04/24/17 95/64   01/31/17 92/58    Weight from Last 3 Encounters:   06/21/17 143 lb (64.9 kg)   04/24/17 141 lb 6 oz (64.1 kg)   01/31/17 154 lb (69.9 kg)              We Performed the Following     Anti Treponema     CHLAMYDIA TRACHOMATIS PCR     HIV Antigen Antibody Combo     NEISSERIA GONORRHOEA PCR     Wet prep          Today's Medication Changes          These changes are accurate as of: 6/21/17  3:14 PM.  If you have any questions, ask your nurse or doctor.               Start taking these medicines.        Dose/Directions    metroNIDAZOLE 500 MG tablet   Commonly known as:  FLAGYL   Used for:  BV (bacterial vaginosis)   Started by:  Melissa Marroquin MD        Dose:  500 mg   Take 1 tablet (500 mg) by mouth 2 times daily   Quantity:  14 tablet   Refills:  0            Where to get your medicines      These medications were sent to [facility] - [place], [place] - [address], [place] [address]     Phone:  [phone]     metroNIDAZOLE 500 MG tablet                Primary Care Provider Office Phone # Fax #    LAYNE Johnson [phone]       [facility] [address] [place] [address]        Equal Access to Services     YEFRI SMITH AH: Maureen rosado  Sharee, cassius tamayorogelio, criss kaswapna ly, ying dayanain hayaan noelalvin kingcaroline laAlissaraheem olaf. So [facility] [phone].    ATENCIÓN: Si eliu franz, tiene a eden disposición servicios gratuitos de asistencia lingüística. Nurys al [phone].    We comply with applicable federal civil rights laws and [place] laws. We do not discriminate on the basis of race, color, national origin, age, disability sex, sexual orientation or gender identity.            Thank you!     Thank you for choosing [facility]  for your care. Our goal is always to provide you with excellent care. Hearing back from our patients is one way we can continue to improve our services. Please take a few minutes to complete the written survey that you may receive in the mail after your visit with us. Thank you!             Your Updated Medication List - Protect others around you: Learn how to safely use, store and throw away your medicines at www.disposemymeds.org.          This list is accurate as of: 6/21/17  3:14 PM.  Always use your most recent med list.                   Brand Name Dispense Instructions for use Diagnosis    levonorgestrel-ethinyl estradiol 0.1-20 MG-MCG per tablet    AVIANE,ALESSE,LESSINA    84 tablet    Take 1 tablet by mouth daily    BCP (birth control pills) initiation       metroNIDAZOLE 500 MG tablet    FLAGYL    14 tablet    Take 1 tablet (500 mg) by mouth 2 times daily    BV (bacterial vaginosis)

## 2017-06-21 NOTE — NURSING NOTE
"Chief Complaint   Patient presents with     Vaginal Problem     STD       Initial /68 (BP Location: Right arm, Patient Position: Chair, Cuff Size: Adult Regular)  Pulse 61  Temp 98.1  F (36.7  C) (Tympanic)  Resp 16  Wt 143 lb (64.9 kg)  SpO2 95%  BMI 26.37 kg/m2 Estimated body mass index is 26.37 kg/(m^2) as calculated from the following:    Height as of 1/31/17: 5' 1.75\" (1.568 m).    Weight as of this encounter: 143 lb (64.9 kg).  Medication Reconciliation: unable or not appropriate to perform         Balta Hopkins MA      "

## 2017-06-21 NOTE — PATIENT INSTRUCTIONS
Bacterial Vaginosis    You have a vaginal infection called bacterial vaginosis (BV). Both good and bad bacteria are present in a healthy vagina. BV occurs when these bacteria get out of balance. The number of bad bacteria increase. And the number of good bacteria decrease.  BV may or may not cause symptoms. If symptoms do occur, they can include:    Thin, gray, milky-white, or sometimes green discharge    Unpleasant odor or  fishy  smell    Itching, burning, or pain in or around the vagina  It is not known what causes BV, but certain factors can make the problem more likely. This can include:    Douching    Having sex with a new partner    Having sex with more than one partner  BV will sometimes go away on its own. But treatment is usually recommended. This is because untreated BV can increase the risk of more serious health problems such as:    Pelvic inflammatory disease (PID)     delivery (giving birth to a baby early if you re pregnant)    HIV and certain other sexually transmitted diseases (STDs)    Infection after surgery on the reproductive organs  Home care  General care    BV is most often treated with medicines called antibiotics. These may be given as pills or as a vaginal cream. If antibiotics are prescribed, be sure to use them exactly as directed. Also, be sure to complete all of the medicine, even if your symptoms go away.    Avoid douching or having sex during treatment.    If you have sex with a female partner, ask your healthcare provider if she should also be treated.  Prevention    Limit or avoid douching.    Avoid having sex. If you do have sex, then take steps to lower your risk:    Use condoms when having sex.    Limit the number of partners you have sex with.  Follow-up care  Follow up with your healthcare provider, or as advised.  When to seek medical advice  Call your healthcare provider right away if:    You have a fever of 100.4 F (38 C) or higher, or as directed by your  provider.    Your symptoms worsen, or they don t go away within a few days of starting treatment.    You have new pain in the lower belly or pelvic region.    You have side effects that bother you or a reaction to the pills or cream you re prescribed.    You or any partners you have sex with have new symptoms, such as a rash, joint pain, or sores.  Date Last Reviewed: 7/30/2015 2000-2017 [employer]. [address] [place] [address], [place] [address]. All rights reserved. This information is not intended as a substitute for professional medical care. Always follow your healthcare professional's instructions.

## 2017-06-22 LAB
C TRACH DNA SPEC QL NAA+PROBE: NORMAL
HIV 1+2 AB+HIV1 P24 AG SERPL QL IA: NORMAL
N GONORRHOEA DNA SPEC QL NAA+PROBE: NORMAL
SPECIMEN SOURCE: NORMAL
SPECIMEN SOURCE: NORMAL
T PALLIDUM IGG+IGM SER QL: NEGATIVE

## 2017-06-23 ASSESSMENT — ENCOUNTER SYMPTOMS
FREQUENCY: 0
ABDOMINAL PAIN: 0
FEVER: 0
VOMITING: 0
NAUSEA: 0
CHILLS: 0
DYSURIA: 0
HEMATURIA: 0
FLANK PAIN: 0

## 2017-07-06 ENCOUNTER — E-VISIT (OUTPATIENT)
Dept: FAMILY MEDICINE | Facility: CLINIC | Age: 37
End: 2017-07-06
Payer: COMMERCIAL

## 2017-07-06 DIAGNOSIS — N39.0 URINARY TRACT INFECTION WITHOUT HEMATURIA, SITE UNSPECIFIED: Primary | ICD-10-CM

## 2017-07-06 PROCEDURE — 98969 ZZC NONPHYSICIAN ONLINE ASSESSMENT AND MANAGEMENT: CPT | Performed by: NURSE PRACTITIONER

## 2017-07-06 RX ORDER — NITROFURANTOIN 25; 75 MG/1; MG/1
100 CAPSULE ORAL 2 TIMES DAILY
Qty: 10 CAPSULE | Refills: 0 | Status: SHIPPED | OUTPATIENT
Start: 2017-07-06 | End: 2018-05-22

## 2017-11-18 DIAGNOSIS — Z30.011 BCP (BIRTH CONTROL PILLS) INITIATION: ICD-10-CM

## 2017-11-21 RX ORDER — LEVONORGESTREL AND ETHINYL ESTRADIOL 0.1-0.02MG
KIT ORAL
Qty: 84 TABLET | Refills: 1 | Status: SHIPPED | OUTPATIENT
Start: 2017-11-21 | End: 2018-04-30

## 2017-12-13 ENCOUNTER — MYC MEDICAL ADVICE (OUTPATIENT)
Dept: FAMILY MEDICINE | Facility: CLINIC | Age: 37
End: 2017-12-13

## 2018-04-30 DIAGNOSIS — Z30.011 BCP (BIRTH CONTROL PILLS) INITIATION: ICD-10-CM

## 2018-04-30 NOTE — TELEPHONE ENCOUNTER
"Requested Prescriptions   Pending Prescriptions Disp Refills     levonorgestrel-ethinyl estradiol (ORSYTHIA) 0.1-20 MG-MCG per tablet  Last Written Prescription Date:  11-21-17  Last Fill Quantity: 84 tab,  # refills: 1   Last office visit: 6/21/2017 with prescribing provider:  Lisa Andrade    Future Office Visit:    84 tablet 1     Sig: Take 1 tablet by mouth daily    Contraceptives Protocol Passed    4/30/2018 11:03 AM       Passed - Patient is not a current smoker if age is 35 or older       Passed - Recent (12 mo) or future (30 days) visit within the authorizing provider's specialty    Patient had office visit in the last 12 months or has a visit in the next 30 days with authorizing provider or within the authorizing provider's specialty.  See \"Patient Info\" tab in inbasket, or \"Choose Columns\" in Meds & Orders section of the refill encounter.           Passed - No active pregnancy on record       Passed - No positive pregnancy test in past 12 months          "

## 2018-05-04 RX ORDER — LEVONORGESTREL/ETHIN.ESTRADIOL 0.1-0.02MG
1 TABLET ORAL DAILY
Qty: 84 TABLET | Refills: 0 | Status: SHIPPED | OUTPATIENT
Start: 2018-05-04 | End: 2018-05-06

## 2018-05-04 NOTE — TELEPHONE ENCOUNTER
Prescription approved per [facility] Refill Protocol.    Signed Prescriptions:                        Disp   Refills    levonorgestrel-ethinyl estradiol (ORSYTHIA*84 tab*0        Sig: Take 1 tablet by mouth daily  Authorizing Provider: JOSEPH SALGUERO  Ordering User: ALEXA LUIS      Closing encounter - no further actions needed at this time    Alexa Luis RN

## 2018-05-06 ENCOUNTER — MYC REFILL (OUTPATIENT)
Dept: FAMILY MEDICINE | Facility: CLINIC | Age: 38
End: 2018-05-06

## 2018-05-06 DIAGNOSIS — Z30.011 BCP (BIRTH CONTROL PILLS) INITIATION: ICD-10-CM

## 2018-05-07 RX ORDER — LEVONORGESTREL/ETHIN.ESTRADIOL 0.1-0.02MG
1 TABLET ORAL DAILY
Qty: 84 TABLET | Refills: 0 | Status: SHIPPED | OUTPATIENT
Start: 2018-05-07 | End: 2018-05-22

## 2018-05-07 NOTE — TELEPHONE ENCOUNTER
Message from [employer]:  Original authorizing provider: LAYNE Larson CNP VALENTENaomie Han would like a refill of the following medications:  levonorgestrel-ethinyl estradiol (ORSYTHIA) 0.1-20 MG-MCG per tablet [LAYNE Larson CNP]    Preferred pharmacy: [facility] - [address]    Comment:  I have new insurance through [facility] that is requesting the renewal go through their automatic mailing service. Just scheduled my every three year pap smear which is due.

## 2018-05-07 NOTE — TELEPHONE ENCOUNTER
"Requested Prescriptions   Pending Prescriptions Disp Refills     levonorgestrel-ethinyl estradiol (ORSYTHIA) 0.1-20 MG-MCG per tablet 84 tablet 0     Sig: Take 1 tablet by mouth daily    Contraceptives Protocol Passed    5/7/2018  1:47 PM       Passed - Patient is not a current smoker if age is 35 or older       Passed - Recent (12 mo) or future (30 days) visit within the authorizing provider's specialty    Patient had office visit in the last 12 months or has a visit in the next 30 days with authorizing provider or within the authorizing provider's specialty.  See \"Patient Info\" tab in inbasket, or \"Choose Columns\" in Meds & Orders section of the refill encounter.           Passed - No active pregnancy on record       Passed - No positive pregnancy test in past 12 months        Last office visit 6/21/2017 - reorder per mail order pharmacy    Signed Prescriptions:                        Disp   Refills    levonorgestrel-ethinyl estradiol (ORSYTHIA*84 tab*0        Sig: Take 1 tablet by mouth daily  Authorizing Provider: JOSEPH SALGUERO  Ordering User: ALEXA LUIS      Closing encounter - no further actions needed at this time    Alexa Luis RN    "

## 2018-05-09 DIAGNOSIS — Z30.011 BCP (BIRTH CONTROL PILLS) INITIATION: ICD-10-CM

## 2018-05-09 RX ORDER — LEVONORGESTREL AND ETHINYL ESTRADIOL 0.1-0.02MG
KIT ORAL
Qty: 0.01 TABLET | Refills: 0 | OUTPATIENT
Start: 2018-05-09

## 2018-05-09 NOTE — TELEPHONE ENCOUNTER
"Requested Prescriptions   Pending Prescriptions Disp Refills     ORSYTHIA 0.1-20 MG-MCG per tablet [Pharmacy Med Name: ORSYTHIA-28 TABLET] 84 tablet 1     Sig: TAKE 1 TABLET BY MOUTH DAILY    Contraceptives Protocol Passed    5/9/2018  1:39 AM       Passed - Patient is not a current smoker if age is 35 or older       Passed - Recent (12 mo) or future (30 days) visit within the authorizing provider's specialty    Patient had office visit in the last 12 months or has a visit in the next 30 days with authorizing provider or within the authorizing provider's specialty.  See \"Patient Info\" tab in inbasket, or \"Choose Columns\" in Meds & Orders section of the refill encounter.           Passed - No active pregnancy on record       Passed - No positive pregnancy test in past 12 months        DUPLICATE REQUEST PATIENT RECENTLY REQUESTED TO [facility] AND NOW WANTS TRANSFER TO LOCAL PHARMACY - PATIENT CAN TRANSFER    Refused Prescriptions:                       Disp   Refills    ORSYTHIA 0.1-20 MG-MCG per tablet [Pharmac*0.01 t*0        Sig: TAKE 1 TABLET BY MOUTH DAILY  Refused By: ALEXA LUIS  Reason for Refusal: Duplicate    Closing encounter - no further actions needed at this time    Alexa Luis RN    "

## 2018-05-22 ENCOUNTER — OFFICE VISIT (OUTPATIENT)
Dept: FAMILY MEDICINE | Facility: CLINIC | Age: 38
End: 2018-05-22
Payer: COMMERCIAL

## 2018-05-22 VITALS
DIASTOLIC BLOOD PRESSURE: 60 MMHG | WEIGHT: 143 LBS | OXYGEN SATURATION: 96 % | HEART RATE: 60 BPM | BODY MASS INDEX: 26.31 KG/M2 | SYSTOLIC BLOOD PRESSURE: 96 MMHG | RESPIRATION RATE: 16 BRPM | HEIGHT: 62 IN | TEMPERATURE: 97.9 F

## 2018-05-22 DIAGNOSIS — Z12.4 SCREENING FOR MALIGNANT NEOPLASM OF CERVIX: ICD-10-CM

## 2018-05-22 DIAGNOSIS — M54.42 LEFT-SIDED LOW BACK PAIN WITH LEFT-SIDED SCIATICA, UNSPECIFIED CHRONICITY: ICD-10-CM

## 2018-05-22 DIAGNOSIS — Z30.41 ENCOUNTER FOR BIRTH CONTROL PILLS MAINTENANCE: ICD-10-CM

## 2018-05-22 DIAGNOSIS — Z01.419 ENCOUNTER FOR GYNECOLOGICAL EXAMINATION WITHOUT ABNORMAL FINDING: Primary | ICD-10-CM

## 2018-05-22 PROCEDURE — G0145 SCR C/V CYTO,THINLAYER,RESCR: HCPCS | Performed by: NURSE PRACTITIONER

## 2018-05-22 PROCEDURE — 99395 PREV VISIT EST AGE 18-39: CPT | Performed by: NURSE PRACTITIONER

## 2018-05-22 PROCEDURE — 87624 HPV HI-RISK TYP POOLED RSLT: CPT | Performed by: NURSE PRACTITIONER

## 2018-05-22 RX ORDER — LEVONORGESTREL/ETHIN.ESTRADIOL 0.1-0.02MG
1 TABLET ORAL DAILY
Qty: 84 TABLET | Refills: 3 | Status: SHIPPED | OUTPATIENT
Start: 2018-05-22 | End: 2019-05-06

## 2018-05-22 RX ORDER — LEVONORGESTREL/ETHIN.ESTRADIOL 0.1-0.02MG
1 TABLET ORAL DAILY
Qty: 84 TABLET | Refills: 0 | Status: SHIPPED | OUTPATIENT
Start: 2018-05-22 | End: 2018-05-22

## 2018-05-22 NOTE — PROGRESS NOTES
SUBJECTIVE:   CC: Brianna Han is an 37 year old woman who presents for preventive health visit.     Physical   Annual:     Getting at least 3 servings of Calcium per day::  Yes    Bi-annual eye exam::  Yes    Dental care twice a year::  Yes    Sleep apnea or symptoms of sleep apnea::  None    Taking medications regularly::  Yes    Medication side effects::  None    Additional concerns today::  YES                Today's PHQ-2 Score:   PHQ-2 ( 1999 Pf[employer]) 5/22/2018   Q1: Little interest or pleasure in doing things 1   Q2: Feeling down, depressed or hopeless 1   PHQ-2 Score 2   Q1: Little interest or pleasure in doing things Several days   Q2: Feeling down, depressed or hopeless Several days   PHQ-2 Score 2       Abuse: Current or Past(Physical, Sexual or Emotional)- No  Do you feel safe in your environment - Yes    Social History   Substance Use Topics     Smoking status: Never Smoker     Smokeless tobacco: Never Used     Alcohol use Yes     Alcohol Use 5/22/2018   If you drink alcohol do you typically have greater than 3 drinks per day OR greater than 7 drinks per week? No   No flowsheet data found.    Reviewed orders with patient.  Reviewed health maintenance and updated orders accordingly - Yes  Labs reviewed in EPIC  BP Readings from Last 3 Encounters:   05/22/18 96/60   06/21/17 104/68   04/24/17 95/64    Wt Readings from Last 3 Encounters:   05/22/18 143 lb (64.9 kg)   06/21/17 143 lb (64.9 kg)   04/24/17 141 lb 6 oz (64.1 kg)                  Patient Active Problem List   Diagnosis     CARDIOVASCULAR SCREENING; LDL GOAL LESS THAN 160     IBS (irritable bowel syndrome)     Encounter for other general counseling or advice on contraception     Nonallopathic lesion of sacral region     Nonallopathic lesion of lower extremities     Pain in joint, lower leg     Family circumstance     Past Surgical History:   Procedure Laterality Date     BREAST SURGERY      Breast reduction 2002     C NONSPECIFIC PROCEDURE   06/2002    breast reduction     GENITOURINARY SURGERY      childhood urethra surgery       Social History   Substance Use Topics     Smoking status: Never Smoker     Smokeless tobacco: Never Used     Alcohol use Yes     Family History   Problem Relation Age of Onset     Breast Cancer Mother      Depression Mother      CEREBROVASCULAR DISEASE Mother 66     CANCER Maternal Grandmother      Breast Cancer Maternal Grandmother      Alcohol/Drug Brother      Depression Sister      Anxiety Disorder Sister          Current Outpatient Prescriptions   Medication Sig Dispense Refill     levonorgestrel-ethinyl estradiol (ORSYTHIA) 0.1-20 MG-MCG per tablet Take 1 tablet by mouth daily 84 tablet 3     [DISCONTINUED] levonorgestrel-ethinyl estradiol (ORSYTHIA) 0.1-20 MG-MCG per tablet Take 1 tablet by mouth daily 84 tablet 0     [DISCONTINUED] levonorgestrel-ethinyl estradiol (ORSYTHIA) 0.1-20 MG-MCG per tablet Take 1 tablet by mouth daily 84 tablet 0     Allergies   Allergen Reactions     Iodine Hives     Latex      Recent Labs   Lab Test  11/09/16   0904  02/08/16   1607  09/04/12   1854  09/21/11   1619   LDL  73   --   61   --    HDL  57   --   61   --    TRIG  57   --   146   --    ALT   --   17   --   7   CR   --   0.76   --   0.76   GFRESTIMATED   --   87   --   89   GFRESTBLACK   --   >90   GFR Calc     --   >90   POTASSIUM   --   4.3   --   4.0   TSH   --   1.70  2.57  1.02      Family History   Problem Relation Age of Onset     Breast Cancer Mother      Depression Mother      CEREBROVASCULAR DISEASE Mother 66     CANCER Maternal Grandmother      Breast Cancer Maternal Grandmother      Alcohol/Drug Brother      Depression Sister      Anxiety Disorder Sister            Mammogram not appropriate for this patient based on age.  Self breast exams normal.  Breast reduction surgery 2000.      Pertinent mammograms are reviewed under the imaging tab.  History of abnormal Pap smear:   Last 3 Pap and HPV Results:  "  PAP / HPV 4/27/2015 9/4/2012 9/21/2011   PAP NIL NIL NIL   not currently in a sexual relationship.  Periods: sometimes skipping every other month.     Reviewed and updated as needed this visit by clinical staff  Tobacco  Allergies  Meds  Problems  Med Hx  Surg Hx  Fam Hx  Soc Hx          Reviewed and updated as needed this visit by Provider  Allergies  Meds  Problems          Sciatica:  2 episodes in the last 3 years.  Each episode will last 6 months.  Stretching, chiropractor, exercises, positioning (sleep and desk) all help, but problems persist.  Current flare up started December 2017.  Massage helps, but too expensive.  She has significantly reduced her activities, running, etc.   Low back pain radiating down left side and radiating down into her left calf.  Sometimes tingling in her outer left foot.  Sports therapy assessment at [facility] (bike training).  She is wondering what her next treatment step is.     Review of Systems  CONSTITUTIONAL: NEGATIVE for fever, chills, change in weight  INTEGUMENTARU/SKIN: NEGATIVE for worrisome rashes, moles or lesions  EYES: NEGATIVE for vision changes or irritation  ENT: NEGATIVE for ear, mouth and throat problems  RESP: NEGATIVE for significant cough or SOB  BREAST: NEGATIVE for masses, tenderness or discharge  CV: NEGATIVE for chest pain, palpitations or peripheral edema  GI: NEGATIVE for nausea, abdominal pain, heartburn, or change in bowel habits  : NEGATIVE for unusual urinary or vaginal symptoms. Periods are regular.  MUSCULOSKELETAL: NEGATIVE for significant arthralgias or myalgia  NEURO: NEGATIVE for weakness, dizziness or paresthesias  ENDOCRINE: NEGATIVE for temperature intolerance, skin/hair changes  PSYCHIATRIC: NEGATIVE for changes in mood or affect     OBJECTIVE:   BP 96/60  Pulse 60  Temp 97.9  F (36.6  C)  Resp 16  Ht 5' 1.75\" (1.568 m)  Wt 143 lb (64.9 kg)  LMP 05/12/2018  SpO2 96%  Breastfeeding? No  BMI 26.37 kg/m2  Physical " Exam  GENERAL: healthy, alert and no distress  EYES: Eyes grossly normal to inspection, PERRL and conjunctivae and sclerae normal  HENT: ear canals and TM's normal, nose and mouth without ulcers or lesions  NECK: no adenopathy, no asymmetry, masses, or scars and thyroid normal to palpation  RESP: lungs clear to auscultation - no rales, rhonchi or wheezes  BREAST: normal without masses, tenderness or nipple discharge and no palpable axillary masses or adenopathy  CV: regular rate and rhythm, normal S1 S2, no S3 or S4, no murmur, click or rub, no peripheral edema and peripheral pulses strong  ABDOMEN: soft, nontender, no hepatosplenomegaly, no masses and bowel sounds normal   (female): normal female external genitalia, normal urethral meatus, vaginal mucosa pink, moist, well rugated, and normal cervix/adnexa/uterus without masses or discharge  MS: no gross musculoskeletal defects noted, no edema  SKIN: no suspicious lesions or rashes  NEURO: Normal strength and tone, mentation intact and speech normal  PSYCH: mentation appears normal, affect normal/bright    ASSESSMENT/PLAN:   (Z01.419) Encounter for gynecological examination without abnormal finding  (primary encounter diagnosis)  Comment:   Plan: Pap imaged thin layer screen with HPV -         recommended age 30 - 65 years (select HPV order        below), HPV High Risk Types DNA Cervical,         levonorgestrel-ethinyl estradiol (ORSYTHIA)         0.1-20 MG-MCG per tablet, DISCONTINUED:         levonorgestrel-ethinyl estradiol (ORSYTHIA)         0.1-20 MG-MCG per tablet            (Z30.41) Encounter for birth control pills maintenance  Comment:   Plan: Refills given     (Z12.4) Screening for malignant neoplasm of cervix  Comment: Routine  Plan: Pap imaged thin layer screen with HPV -         recommended age 30 - 65 years (select HPV order        below), HPV High Risk Types DNA Cervical        Pap smear done today    (M54.42) Left-sided low back pain with  "left-sided sciatica, unspecified chronicity  Comment: Uncertain  Plan: [facility] PT, HAND, AND CHIROPRACTIC REFERRAL       I encouraged Brianna to do gentle stretching, range of motion, take ibuprofen or Tylenol as needed.  She will follow-up with physical therapy  For additional stretching and exercises.  If her symptoms persist or do not improve, I would like her to follow-up with Ortho.  She is to let me know by my chart message if she needs that referral.    COUNSELING:  Reviewed preventive health counseling, as reflected in patient instructions     reports that she has never smoked. She has never used smokeless tobacco.    Estimated body mass index is 26.37 kg/(m^2) as calculated from the following:    Height as of this encounter: 5' 1.75\" (1.568 m).    Weight as of this encounter: 143 lb (64.9 kg).   Weight management plan: Discussed healthy diet and exercise guidelines and patient will follow up in 12 months in clinic to re-evaluate.    Counseling Resources:  ATP IV Guidelines  Pooled Cohorts Equation Calculator  Breast Cancer Risk Calculator  FRAX Risk Assessment  ICSI Preventive Guidelines  Dietary Guidelines for Americans, 2010  USDA's MyPlate  ASA Prophylaxis  Lung CA Screening    LAYNE Larson CNP  [facility]  Answers for HPI/ROS submitted by the patient on 5/22/2018   PHQ-2 Score: 2    "

## 2018-05-22 NOTE — MR AVS SNAPSHOT
After Visit Summary   5/22/2018    Brianna Han    MRN: [id number]           Patient Information     Date Of Birth          1980        Visit Information        Provider Department      5/22/2018 10:20 AM Lisa Andrade APRN [facility]        Today's Diagnoses     Encounter for gynecological examination without abnormal finding    -  1    Encounter for birth control pills maintenance        Screening for malignant neoplasm of cervix        Left-sided low back pain with left-sided sciatica, unspecified chronicity          Care Instructions      Preventive Health Recommendations  Female Ages 26 - 39  Yearly exam:   See your health care provider every year in order to    Review health changes.     Discuss preventive care.      Review your medicines if you your doctor has prescribed any.    Until age 30: Get a Pap test every three years (more often if you have had an abnormal result).    After age 30: Talk to your doctor about whether you should have a Pap test every 3 years or have a Pap test with HPV screening every 5 years.   You do not need a Pap test if your uterus was removed (hysterectomy) and you have not had cancer.  You should be tested each year for STDs (sexually transmitted diseases), if you're at risk.   Talk to your provider about how often to have your cholesterol checked.  If you are at risk for diabetes, you should have a diabetes test (fasting glucose).  Shots: Get a flu shot each year. Get a tetanus shot every 10 years.   Nutrition:     Eat at least 5 servings of fruits and vegetables each day.    Eat whole-grain bread, whole-wheat pasta and brown rice instead of white grains and rice.    Talk to your provider about Calcium and Vitamin D.     Lifestyle    Exercise at least 150 minutes a week (30 minutes a day, 5 days of the week). This will help you control your weight and prevent disease.    Limit alcohol to one drink per day.    No smoking.      Wear sunscreen to prevent skin cancer.    See your dentist every six months for an exam and cleaning.            Follow-ups after your visit        Additional Services     [facility] PT, HAND, AND CHIROPRACTIC REFERRAL       **This order will print in the [facility] Scheduling Office**    Physical Therapy, Hand Therapy and Chiropractic Care are available through:    *[facility]  *[facility] [place]  *[facility]    Call one number to schedule at any of the above locations: [phone].    Your provider has referred you to: Physical Therapy at [facility] or [facility]    Indication/Reason for Referral: Low Back Pain  Onset of Illness: > 3 months ago  Therapy Orders: Evaluate and Treat  Special Programs: None  Special Request: None        Additional Comments for the Therapist or Chiropractor:     Please be aware that coverage of these services is subject to the terms and limitations of your health insurance plan.  Call member services at your health plan with any benefit or coverage questions.      Please bring the following to your appointment:    *Your personal calendar for scheduling future appointments  *Comfortable clothing                  Who to contact     If you have questions or need follow up information about today's clinic visit or your schedule please contact [facility] directly at [phone].  Normal or non-critical lab and imaging results will be communicated to you by MyChart, letter or phone within 4 business days after the clinic has received the results. If you do not hear from us within 7 days, please contact the clinic through MyChart or phone. If you have a critical or abnormal lab result, we will notify you by phone as soon as possible.  Submit refill requests through [employer] or call your pharmacy and they will forward the refill request to us. Please allow 3 business days for your refill to be completed.          Additional Information About  "Your Visit        MyChart Information     [employer] gives you secure access to your electronic health record. If you see a primary care provider, you can also send messages to your care team and make appointments. If you have questions, please call your primary care clinic.  If you do not have a primary care provider, please call [phone] and they will assist you.        Care EveryWhere ID     This is your Care EveryWhere ID. This could be used by other organizations to access your [place] medical records  [id number]        Your Vitals Were     Pulse Temperature Respirations Height Last Period Pulse Oximetry    60 97.9  F (36.6  C) 16 5' 1.75\" (1.568 m) 05/12/2018 96%    Breastfeeding? BMI (Body Mass Index)                No 26.37 kg/m2           Blood Pressure from Last 3 Encounters:   05/22/18 96/60   06/21/17 104/68   04/24/17 95/64    Weight from Last 3 Encounters:   05/22/18 143 lb (64.9 kg)   06/21/17 143 lb (64.9 kg)   04/24/17 141 lb 6 oz (64.1 kg)              We Performed the Following     HPV High Risk Types DNA Cervical     [facility] PT, HAND, AND CHIROPRACTIC REFERRAL     Pap imaged thin layer screen with HPV - recommended age 30 - 65 years (select HPV order below)          Today's Medication Changes          These changes are accurate as of 5/22/18 10:55 AM.  If you have any questions, ask your nurse or doctor.               Start taking these medicines.        Dose/Directions    levonorgestrel-ethinyl estradiol 0.1-20 MG-MCG per tablet   Commonly known as:  ORSYTHIA   Used for:  Encounter for gynecological examination without abnormal finding   Started by:  Lisa Andrade APRN CNP        Dose:  1 tablet   Take 1 tablet by mouth daily   Quantity:  84 tablet   Refills:  3            Where to get your medicines      These medications were sent to [facility] - [address], [facility] [address]     Phone:  [phone]     " levonorgestrel-ethinyl estradiol 0.1-20 MG-MCG per tablet                Primary Care Provider Office Phone # Fax #    LAYNE Johnson [facility] [phone] [address] [place] [address]        Equal Access to Services     YEFRI SMITH : Hadii aad ku hadasho Soomaali, waaxda luqadaha, qaybta kaalmada adeegyada, waxay dayanain hayaan grayson malikparvizgabo babin. So [facility] [phone].    ATENCIÓN: Si habla español, tiene a eden disposición servicios gratuitos de asistencia lingüística. [facility] [phone].    We comply with applicable federal civil rights laws and [place] laws. We do not discriminate on the basis of race, color, national origin, age, disability, sex, sexual orientation, or gender identity.            Thank you!     Thank you for choosing [facility]  for your care. Our goal is always to provide you with excellent care. Hearing back from our patients is one way we can continue to improve our services. Please take a few minutes to complete the written survey that you may receive in the mail after your visit with us. Thank you!             Your Updated Medication List - Protect others around you: Learn how to safely use, store and throw away your medicines at www.disposemymeds.org.          This list is accurate as of 5/22/18 10:55 AM.  Always use your most recent med list.                   Brand Name Dispense Instructions for use Diagnosis    levonorgestrel-ethinyl estradiol 0.1-20 MG-MCG per tablet    ORSYTHIA    84 tablet    Take 1 tablet by mouth daily    Encounter for gynecological examination without abnormal finding

## 2018-05-25 LAB
COPATH REPORT: NORMAL
PAP: NORMAL

## 2018-05-30 LAB
FINAL DIAGNOSIS: NORMAL
HPV HR 12 DNA CVX QL NAA+PROBE: NEGATIVE
HPV16 DNA SPEC QL NAA+PROBE: NEGATIVE
HPV18 DNA SPEC QL NAA+PROBE: NEGATIVE
SPECIMEN DESCRIPTION: NORMAL
SPECIMEN SOURCE CVX/VAG CYTO: NORMAL

## 2018-07-02 ENCOUNTER — OFFICE VISIT (OUTPATIENT)
Dept: FAMILY MEDICINE | Facility: CLINIC | Age: 38
End: 2018-07-02
Payer: COMMERCIAL

## 2018-07-02 VITALS
HEIGHT: 61 IN | TEMPERATURE: 97.5 F | OXYGEN SATURATION: 96 % | DIASTOLIC BLOOD PRESSURE: 66 MMHG | WEIGHT: 144 LBS | SYSTOLIC BLOOD PRESSURE: 103 MMHG | BODY MASS INDEX: 27.19 KG/M2 | HEART RATE: 54 BPM

## 2018-07-02 DIAGNOSIS — R30.0 DYSURIA: Primary | ICD-10-CM

## 2018-07-02 LAB
ALBUMIN UR-MCNC: NEGATIVE MG/DL
APPEARANCE UR: CLEAR
BILIRUB UR QL STRIP: NEGATIVE
COLOR UR AUTO: YELLOW
GLUCOSE UR STRIP-MCNC: NEGATIVE MG/DL
HGB UR QL STRIP: NEGATIVE
KETONES UR STRIP-MCNC: NEGATIVE MG/DL
LEUKOCYTE ESTERASE UR QL STRIP: NEGATIVE
NITRATE UR QL: NEGATIVE
PH UR STRIP: 5.5 PH (ref 5–7)
SOURCE: NORMAL
SP GR UR STRIP: 1.01 (ref 1–1.03)
UROBILINOGEN UR STRIP-ACNC: 0.2 EU/DL (ref 0.2–1)

## 2018-07-02 PROCEDURE — 87591 N.GONORRHOEAE DNA AMP PROB: CPT | Performed by: NURSE PRACTITIONER

## 2018-07-02 PROCEDURE — 87491 CHLMYD TRACH DNA AMP PROBE: CPT | Performed by: NURSE PRACTITIONER

## 2018-07-02 PROCEDURE — 99213 OFFICE O/P EST LOW 20 MIN: CPT | Performed by: NURSE PRACTITIONER

## 2018-07-02 PROCEDURE — 81003 URINALYSIS AUTO W/O SCOPE: CPT | Performed by: NURSE PRACTITIONER

## 2018-07-02 NOTE — PROGRESS NOTES
SUBJECTIVE:   Brianna Han is a 37 year old female who presents to clinic today for the following health issues:      Vaginal Symptoms      Duration: 8 days     Description  Burning     Intensity:  Moderate     Accompanying signs and symptoms (fever/dysuria/abdominal or back pain): None    History  Sexually active: yes, single partner  Recent antibiotic use: YES    Precipitating or alleviating factors: None    Therapies tried and outcome: none and antibiotic   Outcome: didn't help    She states that the antibiotic that she has received is not working and she is still experiencing UTI symptoms.     Finished macrobid yesterday morning.  Urinary symptoms did improve but didn't resolve.  Was in [place] over the weekend and noticed ongoing frequency, voiding small amounts, and dysuria.  Not as bad as prior to starting antibiotic.  Today feeling great, symptoms have resolved.  Did take 2 over the counter urinary pills last night.      No vaginal symptoms.  Does have new sexual partner.      Patient Active Problem List   Diagnosis     CARDIOVASCULAR SCREENING; LDL GOAL LESS THAN 160     IBS (irritable bowel syndrome)     Encounter for other general counseling or advice on contraception     Nonallopathic lesion of sacral region     Nonallopathic lesion of lower extremities     Pain in joint, lower leg     Family circumstance     Past Surgical History:   Procedure Laterality Date     BREAST SURGERY      Breast reduction 2002     C NONSPECIFIC PROCEDURE  06/2002    breast reduction     GENITOURINARY SURGERY      childhood urethra surgery       Social History   Substance Use Topics     Smoking status: Never Smoker     Smokeless tobacco: Never Used     Alcohol use Yes     Family History   Problem Relation Age of Onset     Breast Cancer Mother      Depression Mother      Cerebrovascular Disease Mother 66     Cancer Maternal Grandmother      Breast Cancer Maternal Grandmother      Alcohol/Drug Brother      Depression Sister  "     Anxiety Disorder Sister          Current Outpatient Prescriptions   Medication Sig Dispense Refill     levonorgestrel-ethinyl estradiol (ORSYTHIA) 0.1-20 MG-MCG per tablet Take 1 tablet by mouth daily 84 tablet 3       ROS:  Const,  as above, otherwise negative       OBJECTIVE:                                                    /66  Pulse 54  Temp 97.5  F (36.4  C) (Tympanic)  Ht 5' 1\" (1.549 m)  Wt 144 lb (65.3 kg)  SpO2 96%  BMI 27.21 kg/m2   GENERAL APPEARANCE: healthy, alert and no distress  EYES: Eyes grossly normal to inspection and conjunctivae and sclerae normal  RESP: respirations nonlabored  PSYCH: mentation appears normal and affect normal/bright       Diagnostic test results:  Diagnostic Test Results:  Results for orders placed or performed in visit on 07/02/18 (from the past 24 hour(s))   UA reflex to Microscopic and Culture   Result Value Ref Range    Color Urine Yellow     Appearance Urine Clear     Glucose Urine Negative NEG^Negative mg/dL    Bilirubin Urine Negative NEG^Negative    Ketones Urine Negative NEG^Negative mg/dL    Specific Gravity Urine 1.010 1.003 - 1.035    Blood Urine Negative NEG^Negative    pH Urine 5.5 5.0 - 7.0 pH    Protein Albumin Urine Negative NEG^Negative mg/dL    Urobilinogen Urine 0.2 0.2 - 1.0 EU/dL    Nitrite Urine Negative NEG^Negative    Leukocyte Esterase Urine Negative NEG^Negative    Source Midstream Urine         ASSESSMENT/PLAN:                                                    (R30.0) Dysuria  (primary encounter diagnosis)  Comment: symptoms improved with macrobid but were still ongoing over the weekend, now resolved today.  normal UA today  Plan: UA reflex to Microscopic and Culture, NEISSERIA        GONORRHOEA PCR, CHLAMYDIA TRACHOMATIS PCR        Reassured no ongoing urinary infection.  Will get gc/chla,mydia today.,  Reviewed bladder irritants and recommend avoiding them.  Drink lots of fluids and follow up if symptoms return.        See " Patient Instructions    [facility], [facility]    There are no Patient Instructions on file for this visit.

## 2018-07-02 NOTE — MR AVS SNAPSHOT
"              After Visit Summary   7/2/2018    Brianna Han    MRN: [id number]           Patient Information     Date Of Birth          1980        Visit Information        Provider Department      7/2/2018 5:20 PM Concepción Wheatley APRN CNP [facility]        Today's Diagnoses     Dysuria    -  1       Follow-ups after your visit        Who to contact     If you have questions or need follow up information about today's clinic visit or your schedule please contact [facility] directly at [phone].  Normal or non-critical lab and imaging results will be communicated to you by AM Pharmahart, letter or phone within 4 business days after the clinic has received the results. If you do not hear from us within 7 days, please contact the clinic through AM Pharmahart or phone. If you have a critical or abnormal lab result, we will notify you by phone as soon as possible.  Submit refill requests through MEDOP or call your pharmacy and they will forward the refill request to us. Please allow 3 business days for your refill to be completed.          Additional Information About Your Visit        MyChart Information     MEDOP gives you secure access to your electronic health record. If you see a primary care provider, you can also send messages to your care team and make appointments. If you have questions, please call your primary care clinic.  If you do not have a primary care provider, please call [phone] and they will assist you.        Care EveryWhere ID     This is your Care EveryWhere ID. This could be used by other organizations to access your [facility] medical records  [id number]        Your Vitals Were     Pulse Temperature Height Pulse Oximetry BMI (Body Mass Index)       54 97.5  F (36.4  C) (Tympanic) 5' 1\" (1.549 m) 96% 27.21 kg/m2        Blood Pressure from Last 3 Encounters:   07/02/18 103/66   05/22/18 96/60   06/21/17 104/68    Weight from Last 3 Encounters: "   07/02/18 144 lb (65.3 kg)   05/22/18 143 lb (64.9 kg)   06/21/17 143 lb (64.9 kg)              We Performed the Following     CHLAMYDIA TRACHOMATIS PCR     NEISSERIA GONORRHOEA PCR     UA reflex to Microscopic and Culture        Primary Care Provider Office Phone # Fax LAYNE Aguillon [phone] [address] [place] [address]        Equal Access to Services     [facility] LUIS : Hadii aad ku hadasho Soomaali, waaxda luqadaha, qaybta kaalmada adeegyada, waxay idiin hayaan adeeg kharash caro . So [facility] [phone].    ATENCIÓN: Si habla español, tiene a eden disposición servicios gratuitos de asistencia lingüística. [facility] [phone].    We comply with applicable federal civil rights laws and [place] laws. We do not discriminate on the basis of race, color, national origin, age, disability, sex, sexual orientation, or gender identity.            Thank you!     Thank you for choosing [facility]  for your care. Our goal is always to provide you with excellent care. Hearing back from our patients is one way we can continue to improve our services. Please take a few minutes to complete the written survey that you may receive in the mail after your visit with us. Thank you!             Your Updated Medication List - Protect others around you: Learn how to safely use, store and throw away your medicines at www.disposemymeds.org.          This list is accurate as of 7/2/18  5:52 PM.  Always use your most recent med list.                   Brand Name Dispense Instructions for use Diagnosis    levonorgestrel-ethinyl estradiol 0.1-20 MG-MCG per tablet    ORSYTHIA    84 tablet    Take 1 tablet by mouth daily    Encounter for gynecological examination without abnormal finding

## 2018-07-05 LAB
C TRACH DNA SPEC QL NAA+PROBE: NEGATIVE
N GONORRHOEA DNA SPEC QL NAA+PROBE: NEGATIVE
SPECIMEN SOURCE: NORMAL
SPECIMEN SOURCE: NORMAL

## 2018-10-15 ENCOUNTER — OFFICE VISIT (OUTPATIENT)
Dept: FAMILY MEDICINE | Facility: CLINIC | Age: 38
End: 2018-10-15
Payer: COMMERCIAL

## 2018-10-15 VITALS
DIASTOLIC BLOOD PRESSURE: 70 MMHG | TEMPERATURE: 98.1 F | HEART RATE: 59 BPM | SYSTOLIC BLOOD PRESSURE: 104 MMHG | OXYGEN SATURATION: 97 %

## 2018-10-15 DIAGNOSIS — Z23 NEEDS FLU SHOT: ICD-10-CM

## 2018-10-15 DIAGNOSIS — M54.42 LEFT-SIDED LOW BACK PAIN WITH LEFT-SIDED SCIATICA, UNSPECIFIED CHRONICITY: Primary | ICD-10-CM

## 2018-10-15 PROCEDURE — 90686 IIV4 VACC NO PRSV 0.5 ML IM: CPT | Performed by: NURSE PRACTITIONER

## 2018-10-15 PROCEDURE — 99213 OFFICE O/P EST LOW 20 MIN: CPT | Mod: 25 | Performed by: NURSE PRACTITIONER

## 2018-10-15 PROCEDURE — 90471 IMMUNIZATION ADMIN: CPT | Performed by: NURSE PRACTITIONER

## 2018-10-15 RX ORDER — TIZANIDINE 2 MG/1
2-4 TABLET ORAL 3 TIMES DAILY PRN
Qty: 30 TABLET | Refills: 1 | Status: SHIPPED | OUTPATIENT
Start: 2018-10-15 | End: 2019-01-14

## 2018-10-15 RX ORDER — PREDNISONE 20 MG/1
40 TABLET ORAL DAILY
Qty: 14 TABLET | Refills: 0 | Status: SHIPPED | OUTPATIENT
Start: 2018-10-15 | End: 2018-10-22

## 2018-10-15 RX ORDER — LEVONORGESTREL/ETHIN.ESTRADIOL 0.1-0.02MG
1 TABLET ORAL DAILY
Qty: 84 TABLET | Refills: 3 | Status: CANCELLED | OUTPATIENT
Start: 2018-10-15

## 2018-10-15 NOTE — PROGRESS NOTES
Injectable Influenza Immunization Documentation      Prior to injection verified patient identity using patient's name and date of birth.  Due to injection administration, patient instructed to remain in clinic for 15 minutes  afterwards, and to report any adverse reaction to me immediately.      1.  Is the person to be vaccinated sick today?   No    2. Does the person to be vaccinated have an allergy to a component   of the vaccine?   No  Egg Allergy Algorithm Link    3. Has the person to be vaccinated ever had a serious reaction   to influenza vaccine in the past?   No    4. Has the person to be vaccinated ever had Guillain-Barré syndrome?   No    Form completed by Theresa Hooks MA

## 2018-10-15 NOTE — MR AVS SNAPSHOT
After Visit Summary   10/15/2018    Brianna Han    MRN: [id number]           Patient Information     Date Of Birth          1980        Visit Information        Provider Department      10/15/2018 2:00 PM Brianna Washington NP [facility]        Today's Diagnoses     Left-sided low back pain with left-sided sciatica, unspecified chronicity    -  1    Needs flu shot           Follow-ups after your visit        Additional Services     [facility] PT, HAND, AND CHIROPRACTIC REFERRAL       Physical Therapy, Hand Therapy and Chiropractic Care are available through:  *[facility]  *Hand Therapy (Occupational Therapy or Physical Therapy)  *[facility]    Call one number to schedule at any of the above locations: [phone].    Physical therapy, Hand therapy and/or Chiropractic care has been recommended by your physician as an excellent treatment option to reduce pain and help people return to normal activities, including sports.  Therapy and/or chiropractic care services are a great complement or alternative to expensive and invasive surgery, injections, or long-term use of prescription medications. The primary goal is to identify the underlying problem and provide you the tools to manage your condition on your own.     Please be aware that coverage of these services is subject to the terms and limitations of your health insurance plan.  Call member services at your health plan with any benefit or coverage questions.      Please bring the following to your appointment:  *Your personal calendar for scheduling future appointments  *Comfortable clothing                  Future tests that were ordered for you today     Open Future Orders        Priority Expected Expires Ordered    EMIL[facility]NA PT, HAND, AND CHIROPRACTIC REFERRAL Routine  10/15/2019 10/15/2018            Who to contact     If you have questions or need follow up information about today's  "clinic visit or your schedule please contact [facility] directly at [phone].  Normal or non-critical lab and imaging results will be communicated to you by MyChart, letter or phone within 4 business days after the clinic has received the results. If you do not hear from us within 7 days, please contact the clinic through Flexionhart or phone. If you have a critical or abnormal lab result, we will notify you by phone as soon as possible.  Submit refill requests through Drillster or call your pharmacy and they will forward the refill request to us. Please allow 3 business days for your refill to be completed.          Additional Information About Your Visit        MyChart Information     Drillster lets you send messages to your doctor, view your test results, renew your prescriptions, schedule appointments and more. To sign up, go to [email or web address] . Click on \"Log in\" on the left side of the screen, which will take you to the Welcome page. Then click on \"Sign up Now\" on the right side of the page.     You will be asked to enter the access code listed below, as well as some personal information. Please follow the directions to create your username and password.     Your access code is: [email or web address]  Expires: 2019  2:50 PM     Your access code will  in 90 days. If you need help or a new code, please call your [facility] clinic or [phone].        Care EveryWhere ID     This is your Care EveryWhere ID. This could be used by other organizations to access your [facility] medical records  [id number]        Your Vitals Were     Pulse Temperature Pulse Oximetry             59 98.1  F (36.7  C) (Oral) 97%          Blood Pressure from Last 3 Encounters:   10/15/18 104/70   18 103/66   18 96/60    Weight from Last 3 Encounters:   10/15/18 (P) 145 lb (65.8 kg)   18 144 lb (65.3 kg)   18 143 lb (64.9 kg)              We Performed the Following     HC FLU VAC PRESRV " FREE QUAD SPLIT VIR 3+YRS IM          Today's Medication Changes          These changes are accurate as of 10/15/18  2:50 PM.  If you have any questions, ask your nurse or doctor.               Start taking these medicines.        Dose/Directions    predniSONE 20 MG tablet   Commonly known as:  DELTASONE   Used for:  Left-sided low back pain with left-sided sciatica, unspecified chronicity   Started by:  Brianna Washington NP        Dose:  40 mg   Take 2 tablets (40 mg) by mouth daily for 7 days   Quantity:  14 tablet   Refills:  0       tiZANidine 2 MG tablet   Commonly known as:  ZANAFLEX   Used for:  Left-sided low back pain with left-sided sciatica, unspecified chronicity   Started by:  Brianna Washington NP        Dose:  2-4 mg   Take 1-2 tablets (2-4 mg) by mouth 3 times daily as needed for muscle spasms   Quantity:  30 tablet   Refills:  1            Where to get your medicines      These medications were sent to [facility] - [place], [place] - [address]  [address], [place] [address]     Phone:  [phone]     predniSONE 20 MG tablet    tiZANidine 2 MG tablet                Primary Care Provider Office Phone # Fax #    LAYNE Johnson [facility] [phone]       [address] [place] [address]        Equal Access to Services     YEFRI SMITH : Hadii dalia wang hadasho Soomaali, waaxda luqadaha, qaybta kaalmada adeegyada, ying babin. So [facility] [phone].    ATENCIÓN: Si habla español, tiene a eden disposición servicios gratuitos de asistencia lingüística. L[facility]ia al [phone].    We comply with applicable federal civil rights laws and [place] laws. We do not discriminate on the basis of race, color, national origin, age, disability, sex, sexual orientation, or gender identity.            Thank you!     Thank you for choosing [facility]  for your care. Our goal is always to provide you with  excellent care. Hearing back from our patients is one way we can continue to improve our services. Please take a few minutes to complete the written survey that you may receive in the mail after your visit with us. Thank you!             Your Updated Medication List - Protect others around you: Learn how to safely use, store and throw away your medicines at www.disposemymeds.org.          This list is accurate as of 10/15/18  2:50 PM.  Always use your most recent med list.                   Brand Name Dispense Instructions for use Diagnosis    levonorgestrel-ethinyl estradiol 0.1-20 MG-MCG per tablet    ORSYTHIA    84 tablet    Take 1 tablet by mouth daily    Encounter for gynecological examination without abnormal finding       predniSONE 20 MG tablet    DELTASONE    14 tablet    Take 2 tablets (40 mg) by mouth daily for 7 days    Left-sided low back pain with left-sided sciatica, unspecified chronicity       tiZANidine 2 MG tablet    ZANAFLEX    30 tablet    Take 1-2 tablets (2-4 mg) by mouth 3 times daily as needed for muscle spasms    Left-sided low back pain with left-sided sciatica, unspecified chronicity

## 2018-10-15 NOTE — PROGRESS NOTES
"  SUBJECTIVE:   Brianna Han is a 38 year old female who presents to clinic today for the following health issues:      Back Pain       Duration: flare up a week ago from last Sunday. Ran 10 mile [employer] a week ago    Not going away. Waking up in the middle of the night with pain         Specific cause: preformus muscle and sciatic nerve     Description:   Location of pain: low back left  Character of pain: sharp   Pain radiation:radiates into the left leg to the ankle  New numbness or weakness in legs, not attributed to pain:  Numbness lateral lower leg  Leg feels constricted, \"like something is pinching it when I move\", If I move to quickly sharp pain.    No weakness.    Intensity: At its worst 9/10, not manageable.     History:       Pain interferes with job: YES  History of back problems: This flare up is similar to the one in 2016. Which was debilitating for about a year. She did get massages at that time.    Any previous MRI or X-rays: None  Sees a specialist for back pain:  No  Therapies tried without relief:  In the past she has tried: ice, heat, piriformis stretches, alignment exercise, magnesium,  folic acid, vitamin d.  She has seen a chiropractor in the past    She is taking Ibuprofen - max 5 tablets a day.  Pain is waking her up at night.     Any attempt to make it better makes it worse    Alleviating factors:   Improved by: ibuprofen       Precipitating factors:  Worsened by: sleeping. Aggravated by angle of sitting in the car.       Accompanying Signs & Symptoms:  Risk of Fracture:  None  Risk of Cauda Equina:  None  Risk of Infection:  None  Risk of Cancer:  None  Risk of Ankylosing Spondylitis:  Onset at age <35, male, AND morning back stiffness. no           She has had low-grade chronic back pain for years.          Problem list and histories reviewed & adjusted, as indicated.  Additional history: as documented        Reviewed and updated as needed this visit by clinical staff     "   Reviewed and updated as needed this visit by Provider         ROS:  CONSTITUTIONAL: NEGATIVE for fever, chills, change in weight  ENT/MOUTH: NEGATIVE for ear, mouth and throat problems  RESP: NEGATIVE for significant cough or SOB  CV: NEGATIVE for chest pain, palpitations or peripheral edema  GI: NEGATIVE for nausea, abdominal pain, heartburn, or change in bowel habits  MUSCULOSKELETAL: see HPI  NEURO: see HPI  PSYCHIATRIC: NEGATIVE for changes in mood or affect    OBJECTIVE:     /70  Pulse 59  Temp 98.1  F (36.7  C) (Oral)  Resp (P) 18  Wt (P) 145 lb (65.8 kg)  SpO2 97%  BMI (P) 27.4 kg/m2  Body mass index is 27.4 kg/(m^2) (pended).  GENERAL: healthy, alert and no distress  RESP: lungs clear to auscultation - no rales, rhonchi or wheezes  CV: regular rate and rhythm, normal S1 S2, no S3 or S4, no murmur, click or rub, no peripheral edema and peripheral pulses strong  ABDOMEN: soft, nontender, no hepatosplenomegaly, no masses and bowel sounds normal  MS: Back inspection: symmetrical, no spinal curvature  Tenderness: no vertebral tenderness, SI joint tenderness - left  Musculoskeletal: ROM: full, but pain with right lateral bending  Neuro: Strength: 5/5 lower extremities bilaterally, able to heel walk and toe walk  Sensation: sensation to light touch grossly intact and equal bilaterally  Reflexes: patellar reflex 2/4 bilaterally, achilles reflex 2/4 bilaterally  Straight leg raise: positive left leg  Cross leg raise: negative  PSYCH: mentation appears normal, affect normal        ASSESSMENT/PLAN:             1. Left-sided low back pain with left-sided sciatica, unspecified chronicity  Start Prednisone.  Discussed the use and indication of this medication as well as potential side effects.   Tizanidine TID PRN.  Discussed the use and indication of this medication as well as potential side effects.   Referral to PT given.   Discussed red flag symptoms.  Follow up if symptoms persist or worsen.   -  tiZANidine (ZANAFLEX) 2 MG tablet; Take 1-2 tablets (2-4 mg) by mouth 3 times daily as needed for muscle spasms  Dispense: 30 tablet; Refill: 1  - predniSONE (DELTASONE) 20 MG tablet; Take 2 tablets (40 mg) by mouth daily for 7 days  Dispense: 14 tablet; Refill: 0  - EM[facility]NA PT, HAND, AND CHIROPRACTIC REFERRAL; Future    2. Needs flu shot    - HC FLU VAC PRESRV FREE QUAD SPLIT VIR 3+YRS IM  - ADMIN 1st VACCINE        Brianna Washington, NP  [facility]

## 2018-10-17 ENCOUNTER — THERAPY VISIT (OUTPATIENT)
Dept: PHYSICAL THERAPY | Facility: CLINIC | Age: 38
End: 2018-10-17
Payer: COMMERCIAL

## 2018-10-17 DIAGNOSIS — M54.42 LEFT-SIDED LOW BACK PAIN WITH LEFT-SIDED SCIATICA, UNSPECIFIED CHRONICITY: ICD-10-CM

## 2018-10-17 DIAGNOSIS — M54.42 BILATERAL LOW BACK PAIN WITH LEFT-SIDED SCIATICA: Primary | ICD-10-CM

## 2018-10-17 PROCEDURE — 97112 NEUROMUSCULAR REEDUCATION: CPT | Mod: GP | Performed by: PHYSICAL THERAPIST

## 2018-10-17 PROCEDURE — 97161 PT EVAL LOW COMPLEX 20 MIN: CPT | Mod: GP | Performed by: PHYSICAL THERAPIST

## 2018-10-17 PROCEDURE — 97530 THERAPEUTIC ACTIVITIES: CPT | Mod: GP | Performed by: PHYSICAL THERAPIST

## 2018-10-17 NOTE — MR AVS SNAPSHOT
After Visit Summary   10/17/2018    Brianna Han    MRN: [id number]           Patient Information     Date Of Birth          1980        Visit Information        Provider Department      10/17/2018 4:00 PM Nena Casper, PT [facility]        Today's Diagnoses     Bilateral low back pain with left-sided sciatica    -  1    Left-sided low back pain with left-sided sciatica, unspecified chronicity           Follow-ups after your visit        Your next 10 appointments already scheduled     Oct 24, 2018  2:00 PM CDT   EMILIANA Spine with Nena Casper PT   [facility] ([facility]  )    [address] [address] [phone]            Nov 01, 2018  2:00 PM CDT   EMILIANA Spine with Nena Casper PT   [facility] ([facility]  )    [address] [address] [phone]              Who to contact     If you have questions or need follow up information about today's clinic visit or your schedule please contact [facility] directly at [phone].  Normal or non-critical lab and imaging results will be communicated to you by Viewpoint LLChart, letter or phone within 4 business days after the clinic has received the results. If you do not hear from us within 7 days, please contact the clinic through Viewpoint LLChart or phone. If you have a critical or abnormal lab result, we will notify you by phone as soon as possible.  Submit refill requests through eCircle or call your pharmacy and they will forward the refill request to us. Please allow 3 business days for your refill to be completed.          Additional Information About Your Visit        Viewpoint LLCharCervilenz Information     eCircle lets you send messages to your doctor, view your test results, renew your prescriptions, schedule  "appointments and more. To sign up, go to [email or web address] . Click on \"Log in\" on the left side of the screen, which will take you to the Welcome page. Then click on \"Sign up Now\" on the right side of the page.     You will be asked to enter the access code listed below, as well as some personal information. Please follow the directions to create your username and password.     Your access code is: [email or web address]  Expires: 2019  2:50 PM     Your access code will  in 90 days. If you need help or a new code, please call your [place] clinic or [phone].        Care EveryWhere ID     This is your Care EveryWhere ID. This could be used by other organizations to access your [facility] medical records  [id number]         Blood Pressure from Last 3 Encounters:   10/15/18 104/70   18 103/66   18 96/60    Weight from Last 3 Encounters:   10/15/18 (P) 65.8 kg (145 lb)   18 65.3 kg (144 lb)   18 64.9 kg (143 lb)              We Performed the Following     HC PT EVAL, LOW COMPLEXITY     EMILIANA INITIAL EVAL REPORT     [facility] PT, HAND, AND CHIROPRACTIC REFERRAL     NEUROMUSCULAR RE-EDUCATION     THERAPEUTIC ACTIVITIES        Primary Care Provider Office Phone # Fax #    Lisa Andrade, LAYNE [facility] [phone] [address] [place] [address]        Equal Access to Services     YEFRI SMITH AH: Hadii dalia ku hadasho Soomaali, waaxda luqadaha, qaybta kaalmada adeegyada, ying babin. So [facility] [phone].    ATENCIÓN: Si habla español, tiene a eden disposición servicios gratuitos de asistencia lingüística. Llame al [phone].    We comply with applicable federal civil rights laws and [place] laws. We do not discriminate on the basis of race, color, national origin, age, disability, sex, sexual orientation, or gender identity.            Thank you!     Thank you for choosing [facility] PHYSICAL " THERAPY  for your care. Our goal is always to provide you with excellent care. Hearing back from our patients is one way we can continue to improve our services. Please take a few minutes to complete the written survey that you may receive in the mail after your visit with us. Thank you!             Your Updated Medication List - Protect others around you: Learn how to safely use, store and throw away your medicines at www.disposemymeds.org.          This list is accurate as of 10/17/18 11:59 PM.  Always use your most recent med list.                   Brand Name Dispense Instructions for use Diagnosis    levonorgestrel-ethinyl estradiol 0.1-20 MG-MCG per tablet    ORSYTHIA    84 tablet    Take 1 tablet by mouth daily    Encounter for gynecological examination without abnormal finding       predniSONE 20 MG tablet    DELTASONE    14 tablet    Take 2 tablets (40 mg) by mouth daily for 7 days    Left-sided low back pain with left-sided sciatica, unspecified chronicity       tiZANidine 2 MG tablet    ZANAFLEX    30 tablet    Take 1-2 tablets (2-4 mg) by mouth 3 times daily as needed for muscle spasms    Left-sided low back pain with left-sided sciatica, unspecified chronicity

## 2018-10-17 NOTE — PROGRESS NOTES
"  HPI  System  Physical Exam  General   [facility]        Physical Therapy Initial Examination/Evaluation October 17, 2018   Brianna Han is a 38 year old female referred to physical therapy by Brianna Washington for treatment of L sided LBP with L sided sciatica with Precautions/Restrictions/MD instructions E&T   Therapist Assessment:   Clinical Impression: Pt presents to [facility] with primary complaint of low back pain with L-sided sciatica.  Per clinical examination, pt with mild weakness in L3-L5 myotomes.  Trial of directional preference into flexion with positive results of centralization. Pt will benefit from skilled physical therapy for continued directional preference trial as well as initiation of core stabilization program and assessment of running mechanics as needed.    Subjective:  Pt reports in 2016 she started getting back pain after a run on the ice. Pain lasted about 6-9 months. She did get relief with massage. Pt trained for a marathon last year and pain returned. She ran the 10 mile on 10/5/2018 and pain was present during the training and then worsened at the end of the run. Pain is present on the L side and pt believes it is sciatica. Pain runs down side of leg. Pt does have a half marathon in a couple of weeks but is ok with walking it. Pt is on prednisone and muscle relaxants.   DOI/onset: MD order  10/18/2018  Mechanism of injury: running, overuse   DOS NA   Related PMH: chronic back pain  Previous treatment: massage therapy, chiropractor   Imaging: none   Chief Complaint: Low Back Pain  Pain: rest 1-2 /10, activity 9/10  Described as: \"nervey\" Alleviated by: Ibuprofen,, Flexion, hamstring stretch  Exacerbated by: Sitting on leg, standing Progression of symptoms since initial onset: staying the same or worsening  Time of day when pain is worse: night   Sleeping: Difficulty sleeping;can't sleep on sides and \"broke\" herself of sleeping on stomach     Social history: Stays very " active, runs   Occupation: Manager Job duties: Computer work    Current HEP/exercise regimen: Running , body pump class, softball   Patient's goals are Get back to running, return to body pump class   Other pertinent PMH: Asthma, dizziness, depression, tinging, calf pain  General health as reported by patient: Excellent   Return to MD: prn      Lumbar Spine Evaluation  Static Posture  Foot: Pes planus/cavus: WNL    Knee: Varus/Valgus: WNL     Hip: Adduction/IR: WNL        Dynamic Movement Screen  Single leg stance observations: Able to hold for >30s  Double limb squat observations: WNL      Trunk Range of Motion  Flexion: 85% with some stretch pain at end range  Extension: 75%   Side bending: WNL bilat  Rotation: WNL bilat  Hamstring: hypertonic bilat      Hip and Knee Strength   MMT Hip Abduction Hip Extension Hip Flexion  Great Toe Extension Ankle Dorsiflexion  Knee Flexion   Left 4+/5 4+/5 4-/5 4-/5 4/5 4-/5   Right 4+/5 4+/5 4+/5 4+/5 4+/5 4+/5     Special Tests  Neural tension: + slump, +SLR on the L   Reflexes: WNL  Dermatomes: WNL  Myotomes: Decreased strength in L3-L5 myotome    Assessment/Plan:    Patient is a 38 year old female with lumbar complaints.    Patient has the following significant findings with corresponding treatment plan.                Diagnosis 1:  L side low back pain with L-sided sciatica  Pain -  US, manual therapy, self management, education, directional preference exercise and home program  Decreased ROM/flexibility - manual therapy, therapeutic exercise, therapeutic activity and home program  Decreased strength - therapeutic exercise, therapeutic activities and home program  Impaired muscle performance - neuro re-education and home program  Decreased function - therapeutic activities and home program    Therapy Evaluation Codes:   1) History comprised of:   Personal factors that impact the plan of care:      Past/current experiences and Time since onset of symptoms.    Comorbidity  factors that impact the plan of care are:      Asthma, Depression, Dizziness and Numbness/tingling.     Medications impacting care: Muscle relaxant and Steroids.  2) Examination of Body Systems comprised of:   Body structures and functions that impact the plan of care:      Lumbar spine.   Activity limitations that impact the plan of care are:      Bending, Dressing, Lifting, Sitting, Standing, Walking and Sleeping.  3) Clinical presentation characteristics are:   Stable/Uncomplicated.  4) Decision-Making    Low complexity using standardized patient assessment instrument and/or measureable assessment of functional outcome.  Cumulative Therapy Evaluation is: Low complexity.    Previous and current functional limitations:  (See Goal Flow Sheet for this information)    Short term and Long term goals: (See Goal Flow Sheet for this information)     Communication ability:  Patient appears to be able to clearly communicate and understand verbal and written communication and follow directions correctly.  Treatment Explanation - The following has been discussed with the patient:   RX ordered/plan of care  Anticipated outcomes  Possible risks and side effects  This patient would benefit from PT intervention to resume normal activities.   Rehab potential is good.    Frequency:  1 X week, once daily  Duration:  for 6 weeks  Discharge Plan:  Achieve all LTG.  Independent in home treatment program.  Reach maximal therapeutic benefit.    Please refer to the daily flowsheet for treatment today, total treatment time and time spent performing 1:1 timed codes.

## 2018-10-18 PROBLEM — M54.42 BILATERAL LOW BACK PAIN WITH LEFT-SIDED SCIATICA: Status: ACTIVE | Noted: 2018-10-18

## 2018-10-24 ENCOUNTER — THERAPY VISIT (OUTPATIENT)
Dept: PHYSICAL THERAPY | Facility: CLINIC | Age: 38
End: 2018-10-24
Payer: COMMERCIAL

## 2018-10-24 DIAGNOSIS — M54.42 BILATERAL LOW BACK PAIN WITH LEFT-SIDED SCIATICA: ICD-10-CM

## 2018-10-24 PROCEDURE — 97110 THERAPEUTIC EXERCISES: CPT | Mod: GP | Performed by: PHYSICAL THERAPIST

## 2018-10-24 PROCEDURE — 97012 MECHANICAL TRACTION THERAPY: CPT | Mod: GP | Performed by: PHYSICAL THERAPIST

## 2018-10-24 PROCEDURE — 97112 NEUROMUSCULAR REEDUCATION: CPT | Mod: GP | Performed by: PHYSICAL THERAPIST

## 2018-10-26 ENCOUNTER — THERAPY VISIT (OUTPATIENT)
Dept: PHYSICAL THERAPY | Facility: CLINIC | Age: 38
End: 2018-10-26
Payer: COMMERCIAL

## 2018-10-26 ENCOUNTER — MYC REFILL (OUTPATIENT)
Dept: FAMILY MEDICINE | Facility: CLINIC | Age: 38
End: 2018-10-26

## 2018-10-26 DIAGNOSIS — M54.42 LEFT-SIDED LOW BACK PAIN WITH LEFT-SIDED SCIATICA, UNSPECIFIED CHRONICITY: ICD-10-CM

## 2018-10-26 DIAGNOSIS — M54.42 BILATERAL LOW BACK PAIN WITH LEFT-SIDED SCIATICA: ICD-10-CM

## 2018-10-26 PROCEDURE — 97112 NEUROMUSCULAR REEDUCATION: CPT | Mod: GP | Performed by: PHYSICAL THERAPIST

## 2018-10-26 PROCEDURE — 97012 MECHANICAL TRACTION THERAPY: CPT | Mod: GP | Performed by: PHYSICAL THERAPIST

## 2018-10-26 PROCEDURE — 97110 THERAPEUTIC EXERCISES: CPT | Mod: GP | Performed by: PHYSICAL THERAPIST

## 2018-10-26 RX ORDER — TIZANIDINE 2 MG/1
2-4 TABLET ORAL 3 TIMES DAILY PRN
Qty: 30 TABLET | Refills: 1 | Status: CANCELLED | OUTPATIENT
Start: 2018-10-26

## 2018-10-26 NOTE — TELEPHONE ENCOUNTER
Message from [employer]:  Original authorizing provider: Brianna Washington, REGINALD Han would like a refill of the following medications:  tiZANidine (ZANAFLEX) 2 MG tablet [Brianna Washington NP]    Preferred pharmacy: [facility] - [place], [place] - [address]    Comment:  Still having back issues sleeping and these seem to help at night. Requesting 1 refill to take as needed.

## 2018-11-01 ENCOUNTER — THERAPY VISIT (OUTPATIENT)
Dept: PHYSICAL THERAPY | Facility: CLINIC | Age: 38
End: 2018-11-01
Payer: COMMERCIAL

## 2018-11-01 DIAGNOSIS — M54.42 BILATERAL LOW BACK PAIN WITH LEFT-SIDED SCIATICA: ICD-10-CM

## 2018-11-01 PROCEDURE — 97012 MECHANICAL TRACTION THERAPY: CPT | Mod: GP | Performed by: PHYSICAL THERAPIST

## 2018-11-01 PROCEDURE — 97110 THERAPEUTIC EXERCISES: CPT | Mod: GP | Performed by: PHYSICAL THERAPIST

## 2018-11-01 PROCEDURE — 97112 NEUROMUSCULAR REEDUCATION: CPT | Mod: GP | Performed by: PHYSICAL THERAPIST

## 2018-11-15 ENCOUNTER — THERAPY VISIT (OUTPATIENT)
Dept: PHYSICAL THERAPY | Facility: CLINIC | Age: 38
End: 2018-11-15
Payer: COMMERCIAL

## 2018-11-15 DIAGNOSIS — M54.42 BILATERAL LOW BACK PAIN WITH LEFT-SIDED SCIATICA: ICD-10-CM

## 2018-11-15 PROCEDURE — 97112 NEUROMUSCULAR REEDUCATION: CPT | Mod: GP | Performed by: PHYSICAL THERAPIST

## 2018-11-15 PROCEDURE — 97110 THERAPEUTIC EXERCISES: CPT | Mod: GP | Performed by: PHYSICAL THERAPIST

## 2018-11-26 ENCOUNTER — THERAPY VISIT (OUTPATIENT)
Dept: PHYSICAL THERAPY | Facility: CLINIC | Age: 38
End: 2018-11-26
Payer: COMMERCIAL

## 2018-11-26 DIAGNOSIS — M54.42 BILATERAL LOW BACK PAIN WITH LEFT-SIDED SCIATICA: ICD-10-CM

## 2018-11-26 PROCEDURE — 97110 THERAPEUTIC EXERCISES: CPT | Mod: GP | Performed by: PHYSICAL THERAPIST

## 2018-11-26 PROCEDURE — 97112 NEUROMUSCULAR REEDUCATION: CPT | Mod: GP | Performed by: PHYSICAL THERAPIST

## 2018-11-26 NOTE — PROGRESS NOTES
[facility]  System  Physical Exam  General   ROS    PROGRESS  REPORT    Progress reporting period is from 10/17/2018 to 11/26/2018.       SUBJECTIVE  Subjective: Pt reports that drinking alcohol exacerbates pain. The firmer mattress does help with pain.  Pt is almost all off of ibuprofen and is totally off of ibuprofen. She has done exercises all but 2 days since last session.     Current pain level is: No pain noted, more muscle fatigue  Initial Pain level: 7/10.   Changes in function:  Yes (See Goal flowsheet attached for changes in current functional level)  Adverse reaction to treatment or activity: None    OBJECTIVE  Changes noted in objective findings:  Yes, improved pain, function, strength, and body mechanics  Objective: Progressed to more dynamic exercises. Fatigue, but no increased back pain noted.      ASSESSMENT/PLAN  Updated problem list and treatment plan: Diagnosis 1:  Low Back Pain with L-sided sciatica  Pain -  hot/cold therapy, education, directional preference exercise and home program  Decreased strength - therapeutic exercise, therapeutic activities and home program  Decreased function - therapeutic activities and home program  STG/LTGs have been met or progress has been made towards goals:  Yes (See Goal flow sheet completed today.)  Assessment of Progress: The patient's condition is improving.  Self Management Plans:  Patient has been instructed in a home treatment program.  I have re-evaluated this patient and find that the nature, scope, duration and intensity of the therapy is appropriate for the medical condition of the patient.  Brianna continues to require the following intervention to meet STG and LTG's:  PT    Recommendations:  This patient would benefit from continued therapy.     Frequency:  2 X a month, once daily  Duration:  for 2 months        Please refer to the daily flowsheet for treatment today, total treatment time and time spent performing 1:1 timed codes.

## 2018-11-26 NOTE — MR AVS SNAPSHOT
After Visit Summary   11/26/2018    Brianna Han    MRN: [id number]           Patient Information     Date Of Birth          1980        Visit Information        Provider Department      11/26/2018 4:00 PM Nena Casper, PT [facility] Physical [facility]        Today's Diagnoses     Bilateral low back pain with left-sided sciatica           Follow-ups after your visit        Your next 10 appointments already scheduled     Dec 06, 2018  3:20 PM CST   EMILIANA Spine with Nena Casper PT   [facility] Physical Therapy ([facility]  )    [address] [place] [id number] [phone]            Dec 19, 2018  5:20 PM CST   EMILIANA Spine with Nena Casper PT   [facility] Physical Therapy ([facility]  )    [address] [place] [id number] [phone]              Who to contact     If you have questions or need follow up information about today's clinic visit or your schedule please contact [facility] PHYSICAL THERAPY directly at [phone].  Normal or non-critical lab and imaging results will be communicated to you by Next Generation Systemshart, letter or phone within 4 business days after the clinic has received the results. If you do not hear from us within 7 days, please contact the clinic through Next [employer] Systemshart or phone. If you have a critical or abnormal lab result, we will notify you by phone as soon as possible.  Submit refill requests through [employer] or call your pharmacy and they will forward the refill request to us. Please allow 3 business days for your refill to be completed.          Additional Information About Your Visit        MyChart Information     [employer] gives you secure access to your electronic health record. If you see a primary care provider, you can also send messages to your care team and make appointments. If you have  questions, please call your primary care clinic.  If you do not have a primary care provider, please call [phone] and they will assist you.        Care EveryWhere ID     This is your Care EveryWhere ID. This could be used by other organizations to access your [place] medical records  [id number]         Blood Pressure from Last 3 Encounters:   10/15/18 104/70   07/02/18 103/66   05/22/18 96/60    Weight from Last 3 Encounters:   10/15/18 (P) 65.8 kg (145 lb)   07/02/18 65.3 kg (144 lb)   05/22/18 64.9 kg (143 lb)              We Performed the Following     EMILIANA PROGRESS NOTES REPORT     NEUROMUSCULAR RE-EDUCATION     THERAPEUTIC EXERCISES        Primary Care Provider Office Phone # Fax #    LAYNE [facility] [phone] [address] [place] [address]        Equal Access to Services     YEFRI SMITH : Hadii aad ku hadasho Soaddi, waaxda luqadaha, qaybta kaalmada adeegyada, ying elizondo . So [facility] [phone].    ATENCIÓN: Si cassila osei, tiene a eden disposición servicios gratuitos de asistencia lingüística. Llame al [phone].    We comply with applicable federal civil rights laws and [place] laws. We do not discriminate on the basis of race, color, national origin, age, disability, sex, sexual orientation, or gender identity.            Thank you!     Thank you for choosing [facility] PHYSICAL THERAPY  for your care. Our goal is always to provide you with excellent care. Hearing back from our patients is one way we can continue to improve our services. Please take a few minutes to complete the written survey that you may receive in the mail after your visit with us. Thank you!             Your Updated Medication List - Protect others around you: Learn how to safely use, store and throw away your medicines at www.disposemymeds.org.          This list is accurate as of 11/26/18  5:24 PM.   Always use your most recent med list.                   Brand Name Dispense Instructions for use Diagnosis    levonorgestrel-ethinyl estradiol 0.1-20 MG-MCG per tablet    ORSYTHIA    84 tablet    Take 1 tablet by mouth daily    Encounter for gynecological examination without abnormal finding       tiZANidine 2 MG tablet    ZANAFLEX    30 tablet    Take 1-2 tablets (2-4 mg) by mouth 3 times daily as needed for muscle spasms    Left-sided low back pain with left-sided sciatica, unspecified chronicity

## 2018-12-19 ENCOUNTER — THERAPY VISIT (OUTPATIENT)
Dept: PHYSICAL THERAPY | Facility: CLINIC | Age: 38
End: 2018-12-19
Payer: COMMERCIAL

## 2018-12-19 DIAGNOSIS — M54.42 BILATERAL LOW BACK PAIN WITH LEFT-SIDED SCIATICA: ICD-10-CM

## 2018-12-19 PROCEDURE — 97110 THERAPEUTIC EXERCISES: CPT | Mod: GP | Performed by: PHYSICAL THERAPIST

## 2018-12-19 PROCEDURE — 97530 THERAPEUTIC ACTIVITIES: CPT | Mod: GP | Performed by: PHYSICAL THERAPIST

## 2018-12-20 NOTE — PROGRESS NOTES
[facility]         System  Physical Exam  General   ROS    DISCHARGE REPORT    Progress reporting period is from 10/17/2018 to 12/19/2018.       SUBJECTIVE  Subjective: Pt reports that she is essentially pain free with all activiites.  She did have some discomfort in back after standing for prolonged period of time at a museum. Otherwise, she has started return to run program with no issues, has returned to exercise classes, and knows which exercises she needs to complete in order to keep symptoms under control.    Current Pain level: 0/10.     Initial Pain level: 7/10.   Changes in function:  Yes (See Goal flowsheet attached for changes in current functional level)  Adverse reaction to treatment or activity: None    OBJECTIVE  Oswestry Score: 2 %        Changes noted in objective findings:  Yes, improved pain symptoms, core stability, and overall functional strength    Objective: Reviewed HEP and return to run program. All goals met and pt ready for discharge this date.      ASSESSMENT/PLAN    STG/LTGs have been met or progress has been made towards goals:  Yes (See Goal flow sheet completed today.)  Assessment of Progress: The patient's condition is improving.  Self Management Plans:  Patient has been instructed in a home treatment program.  Brianna continues to require the following intervention to meet STG and LTG's:  PT intervention is no longer required to meet STG/LTG.    Recommendations:  This patient is ready to be discharged from therapy and continue their home treatment program.    Please refer to the daily flowsheet for treatment today, total treatment time and time spent performing 1:1 timed codes.

## 2018-12-23 PROBLEM — M54.42 BILATERAL LOW BACK PAIN WITH LEFT-SIDED SCIATICA: Status: RESOLVED | Noted: 2018-10-18 | Resolved: 2018-12-19

## 2019-01-14 ENCOUNTER — THERAPY VISIT (OUTPATIENT)
Dept: PHYSICAL THERAPY | Facility: CLINIC | Age: 39
End: 2019-01-14
Payer: COMMERCIAL

## 2019-01-14 ENCOUNTER — ALLIED HEALTH/NURSE VISIT (OUTPATIENT)
Dept: NURSING | Facility: CLINIC | Age: 39
End: 2019-01-14
Payer: COMMERCIAL

## 2019-01-14 DIAGNOSIS — M54.50 ACUTE LEFT-SIDED LOW BACK PAIN: Primary | ICD-10-CM

## 2019-01-14 DIAGNOSIS — M54.9 BACK PAIN: Primary | ICD-10-CM

## 2019-01-14 DIAGNOSIS — M54.42 LEFT-SIDED LOW BACK PAIN WITH LEFT-SIDED SCIATICA, UNSPECIFIED CHRONICITY: ICD-10-CM

## 2019-01-14 PROCEDURE — 97530 THERAPEUTIC ACTIVITIES: CPT | Mod: GP | Performed by: PHYSICAL THERAPIST

## 2019-01-14 PROCEDURE — 99211 OFF/OP EST MAY X REQ PHY/QHP: CPT

## 2019-01-14 RX ORDER — TIZANIDINE 2 MG/1
2-4 TABLET ORAL 3 TIMES DAILY PRN
Qty: 30 TABLET | Refills: 1 | Status: SHIPPED | OUTPATIENT
Start: 2019-01-14 | End: 2019-08-14
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
## 2019-01-14 NOTE — PROGRESS NOTES
Subjective:  HPI                    Objective:  System    Physical Exam    General     ROS    Assessment/Plan:    PROGRESS  REPORT           SUBJECTIVE  Pt reports she started to work out again involving softball and running for 4 days in a row.  She reports she was running 3 miles and doing 1 hour work out of intense interval training.   She reports afterwards she felt a pop and then couldn't move or get out of bed.       She reports left sided low back pain, but denies leg pain.  She feels like she is having trouble holding her body up when sitting.   Pt reports she just saw the nurse next door and received muscle relaxants.     OBJECTIVE      ASSESSMENT/PLAN  Updated problem list and treatment plan: Diagnosis 1:  Back pain  Pain -  hot/cold therapy  Decreased ROM/flexibility - manual therapy and therapeutic exercise  Decreased strength - therapeutic exercise and therapeutic activities  Impaired balance - neuro re-education and therapeutic activities  Decreased proprioception - neuro re-education and therapeutic activities  Impaired gait - gait training  Impaired muscle performance - neuro re-education  Decreased function - therapeutic activities  STG/LTGs have been met or progress has been made towards goals:  Yes (See Goal flow sheet completed today.)  Assessment of Progress: The patient's condition is improving.  Self Management Plans:  Patient is independent in a home treatment program.  I have re-evaluated this patient and find that the nature, scope, duration and intensity of the therapy is appropriate for the medical condition of the patient.  Brianna continues to require the following intervention to meet STG and LTG's:  PT    Recommendations:  This patient would benefit from continued therapy.     Frequency:  1 X week, once daily  Duration:  for 4 weeks        Please refer to the daily flowsheet for treatment today, total treatment time and time spent performing 1:1 timed codes.

## 2019-01-23 ENCOUNTER — THERAPY VISIT (OUTPATIENT)
Dept: PHYSICAL THERAPY | Facility: CLINIC | Age: 39
End: 2019-01-23
Payer: COMMERCIAL

## 2019-01-23 DIAGNOSIS — M54.9 BACK PAIN: Primary | ICD-10-CM

## 2019-01-23 PROCEDURE — 97530 THERAPEUTIC ACTIVITIES: CPT | Mod: GP | Performed by: PHYSICAL THERAPIST

## 2019-01-23 PROCEDURE — 97110 THERAPEUTIC EXERCISES: CPT | Mod: GP | Performed by: PHYSICAL THERAPIST

## 2019-02-04 ENCOUNTER — THERAPY VISIT (OUTPATIENT)
Dept: PHYSICAL THERAPY | Facility: CLINIC | Age: 39
End: 2019-02-04
Payer: COMMERCIAL

## 2019-02-04 DIAGNOSIS — M54.9 BACK PAIN: Primary | ICD-10-CM

## 2019-02-04 PROCEDURE — 97110 THERAPEUTIC EXERCISES: CPT | Mod: GP | Performed by: PHYSICAL THERAPIST

## 2019-02-04 PROCEDURE — 97530 THERAPEUTIC ACTIVITIES: CPT | Mod: GP | Performed by: PHYSICAL THERAPIST

## 2019-05-04 DIAGNOSIS — Z01.419 ENCOUNTER FOR GYNECOLOGICAL EXAMINATION WITHOUT ABNORMAL FINDING: ICD-10-CM

## 2019-05-05 NOTE — TELEPHONE ENCOUNTER
"Requested Prescriptions   Pending Prescriptions Disp Refills     LARISSIA 0.1-20 MG-MCG tablet [Pharmacy Med Name: LARISSIA  TAB] 84 tablet 3     Sig: TAKE 1 TABLET BY MOUTH  DAILY      Last Written Prescription Date:  5/22/2018  Last Fill Quantity: 84 tablet    ,  # refills: 3   Last Office Visit: 10/15/2018   Future Office Visit:            Contraceptives Protocol Passed - 5/4/2019  8:30 PM        Passed - Patient is not a current smoker if age is 35 or older        Passed - Recent (12 mo) or future (30 days) visit within the authorizing provider's specialty     Patient had office visit in the last 12 months or has a visit in the next 30 days with authorizing provider or within the authorizing provider's specialty.  See \"Patient Info\" tab in inbasket, or \"Choose Columns\" in Meds & Orders section of the refill encounter.            Passed - Medication is active on med list        Passed - No active pregnancy on record        Passed - No positive pregnancy test in past 12 months          "

## 2019-05-06 RX ORDER — LEVONORGESTREL/ETHIN.ESTRADIOL 0.1-0.02MG
1 TABLET ORAL DAILY
Qty: 84 TABLET | Refills: 0 | Status: SHIPPED | OUTPATIENT
Start: 2019-05-06 | End: 2019-06-06

## 2019-05-06 RX ORDER — LEVONORGESTREL AND ETHINYL ESTRADIOL 0.1-0.02MG
KIT ORAL
Qty: 84 TABLET | Refills: 3 | OUTPATIENT
Start: 2019-05-06

## 2019-05-06 NOTE — TELEPHONE ENCOUNTER
Overridden by Brianna Washington NP on Oct 15, 2018 2:42 PM   Drug-Drug   1. ORAL CONTRACEPTIVES / TIZANIDINE [Level: Moderate]   Other Orders: tiZANidine (ZANAFLEX) 2 MG tablet        Prescription approved per [facility] Refill Protocol.  Dinorah Jenkins RN

## 2019-06-06 ENCOUNTER — OFFICE VISIT (OUTPATIENT)
Dept: FAMILY MEDICINE | Facility: CLINIC | Age: 39
End: 2019-06-06
Payer: COMMERCIAL

## 2019-06-06 VITALS
TEMPERATURE: 98.3 F | DIASTOLIC BLOOD PRESSURE: 64 MMHG | OXYGEN SATURATION: 98 % | HEART RATE: 60 BPM | HEIGHT: 62 IN | SYSTOLIC BLOOD PRESSURE: 100 MMHG | WEIGHT: 144 LBS | RESPIRATION RATE: 18 BRPM | BODY MASS INDEX: 26.5 KG/M2

## 2019-06-06 DIAGNOSIS — Z01.419 ENCOUNTER FOR GYNECOLOGICAL EXAMINATION WITHOUT ABNORMAL FINDING: Primary | ICD-10-CM

## 2019-06-06 DIAGNOSIS — Z23 NEED FOR TDAP VACCINATION: ICD-10-CM

## 2019-06-06 DIAGNOSIS — Z30.41 ENCOUNTER FOR SURVEILLANCE OF CONTRACEPTIVE PILLS: ICD-10-CM

## 2019-06-06 DIAGNOSIS — Z80.3 FAMILY HISTORY OF MALIGNANT NEOPLASM OF BREAST: ICD-10-CM

## 2019-06-06 PROCEDURE — 87491 CHLMYD TRACH DNA AMP PROBE: CPT | Performed by: NURSE PRACTITIONER

## 2019-06-06 PROCEDURE — 99395 PREV VISIT EST AGE 18-39: CPT | Mod: 25 | Performed by: NURSE PRACTITIONER

## 2019-06-06 PROCEDURE — 90471 IMMUNIZATION ADMIN: CPT | Performed by: NURSE PRACTITIONER

## 2019-06-06 PROCEDURE — 90715 TDAP VACCINE 7 YRS/> IM: CPT | Performed by: NURSE PRACTITIONER

## 2019-06-06 PROCEDURE — 87591 N.GONORRHOEAE DNA AMP PROB: CPT | Performed by: NURSE PRACTITIONER

## 2019-06-06 RX ORDER — LEVONORGESTREL/ETHIN.ESTRADIOL 0.1-0.02MG
1 TABLET ORAL DAILY
Qty: 84 TABLET | Status: SHIPPED | OUTPATIENT
Start: 2019-06-06 | End: 2019-11-25

## 2019-06-06 RX ORDER — TIZANIDINE 2 MG/1
2-4 TABLET ORAL 3 TIMES DAILY PRN
Qty: 30 TABLET | Refills: 1 | Status: CANCELLED | OUTPATIENT
Start: 2019-06-06

## 2019-06-06 ASSESSMENT — MIFFLIN-ST. JEOR: SCORE: 1282.46

## 2019-06-06 NOTE — PROGRESS NOTES
SUBJECTIVE:   CC: Brianna Han is an 38 year old woman who presents for preventive health visit.     Healthy Habits:    Do you get at least three servings of calcium containing foods daily (dairy, green leafy vegetables, etc.)? Multivitamin gummies     Amount of exercise or daily activities, outside of work: right now 5 day(s) per week.     Problems taking medications regularly No    Medication side effects: No    Have you had an eye exam in the past two years? No, will do this summer     Do you see a dentist twice per year? yes    Do you have sleep apnea, excessive snoring or daytime drowsiness?nothing significant       Just got back from a trip    STD   HIV for future     Recently had a new partner  Pt is requesting a physical today. MA told pt there was not enough time and to ask PCP. (PCP okayed physical today)            Today's PHQ-2 Score:   PHQ-2 ( 1999 [employer]) 6/6/2019 5/22/2018   Q1: Little interest or pleasure in doing things 0 1   Q2: Feeling down, depressed or hopeless 0 1   PHQ-2 Score 0 2   Q1: Little interest or pleasure in doing things - Several days   Q2: Feeling down, depressed or hopeless - Several days   PHQ-2 Score - 2       Abuse: Current or Past(Physical, Sexual or Emotional)- No  Do you feel safe in your environment? Yes    Social History     Tobacco Use     Smoking status: Never Smoker     Smokeless tobacco: Never Used   Substance Use Topics     Alcohol use: Yes     Comment: 1-2 x per week      If you drink alcohol do you typically have >3 drinks per day or >7 drinks per week? No                     Reviewed orders with patient.  Reviewed health maintenance and updated orders accordingly - Yes          Pertinent mammograms are reviewed under the imaging tab.  History of abnormal Pap smear: NO - age 30-65 PAP every 5 years with negative HPV co-testing recommended  PAP / HPV Latest Ref Rng & Units 5/22/2018 4/27/2015 9/4/2012   PAP - NIL NIL NIL   HPV 16 DNA NEG:Negative Negative - -  "  HPV 18 DNA NEG:Negative Negative - -   OTHER HR HPV NEG:Negative Negative - -     Reviewed and updated as needed this visit by clinical staff  Tobacco  Allergies  Meds  Med Hx  Surg Hx  Fam Hx  Soc Hx        Reviewed and updated as needed this visit by Provider            ROS:  CONSTITUTIONAL: NEGATIVE for fever, chills, change in weight  INTEGUMENTARU/SKIN: NEGATIVE for worrisome rashes, moles or lesions  EYES: NEGATIVE for vision changes or irritation  ENT: NEGATIVE for ear, mouth and throat problems  RESP: NEGATIVE for significant cough or SOB  BREAST: NEGATIVE for masses, tenderness or discharge  CV: NEGATIVE for chest pain, palpitations or peripheral edema  GI: NEGATIVE for nausea, abdominal pain, heartburn, or change in bowel habits  : NEGATIVE for unusual urinary or vaginal symptoms. Periods are regular.  MUSCULOSKELETAL: NEGATIVE for significant arthralgias or myalgia  NEURO: NEGATIVE for weakness, dizziness or paresthesias  PSYCHIATRIC: NEGATIVE for changes in mood or affect    OBJECTIVE:   /64   Pulse 60   Temp 98.3  F (36.8  C) (Oral)   Resp 18   Ht 1.568 m (5' 1.75\")   Wt 65.3 kg (144 lb)   SpO2 98%   BMI 26.55 kg/m    EXAM:  GENERAL: healthy, alert and no distress  EYES: Eyes grossly normal to inspection, PERRL and conjunctivae and sclerae normal  HENT: ear canals and TM's normal, nose and mouth without ulcers or lesions  NECK: no adenopathy, no asymmetry, masses, or scars and thyroid normal to palpation  RESP: lungs clear to auscultation - no rales, rhonchi or wheezes  BREAST: normal without masses, tenderness or nipple discharge and no palpable axillary masses or adenopathy  CV: regular rate and rhythm, normal S1 S2, no S3 or S4, no murmur, click or rub, no peripheral edema and peripheral pulses strong  ABDOMEN: soft, nontender, no hepatosplenomegaly, no masses and bowel sounds normal   (female): normal female external genitalia, normal urethral meatus, vaginal mucosa pink, " "moist, well rugated, and normal cervix/adnexa/uterus without masses or discharge  MS: no gross musculoskeletal defects noted, no edema  SKIN: no suspicious lesions or rashes  NEURO: Normal strength and tone, mentation intact and speech normal  PSYCH: mentation appears normal, affect normal/bright        ASSESSMENT/PLAN:   1. Encounter for gynecological examination without abnormal finding    - Chlamydia trachomatis PCR  - Neisseria gonorrhoeae PCR  - **HIV Antigen Antibody Combo FUTURE anytime; Future    2. Encounter for surveillance of contraceptive pills  Refills given.   - levonorgestrel-ethinyl estradiol (ORSYTHIA) 0.1-20 MG-MCG tablet; Take 1 tablet by mouth daily  Dispense: 84 tablet; Refill: PRN    3. Need for Tdap vaccination    - TDAP, IM (10 - 64 YRS) - Adacel  - ADMIN 1st VACCINE    4. Family history of malignant neoplasm of breast    - *MA Screening Digital Bilateral; Future    COUNSELING:   Reviewed preventive health counseling, as reflected in patient instructions    Estimated body mass index is 26.55 kg/m  as calculated from the following:    Height as of this encounter: 1.568 m (5' 1.75\").    Weight as of this encounter: 65.3 kg (144 lb).    Weight management plan: Discussed healthy diet and exercise guidelines     reports that she has never smoked. She has never used smokeless tobacco.      Counseling Resources:  ATP IV Guidelines  Pooled Cohorts Equation Calculator  Breast Cancer Risk Calculator  FRAX Risk Assessment  ICSI Preventive Guidelines  Dietary Guidelines for Americans, 2010  USDA's MyPlate  ASA Prophylaxis  Lung CA Screening    Brianna Washington NP  [facility]  "

## 2019-06-06 NOTE — NURSING NOTE
Prior to injection, verified patient identity using patient's name and date of birth.  Due to injection administration, patient instructed to remain in clinic for 15 minutes  afterwards, and to report any adverse reaction to me immediately.    Screening Questionnaire for Adult Immunization    Are you sick today?   No   Do you have allergies to medications, food, a vaccine component or latex?   Latex   Have you ever had a serious reaction after receiving a vaccination?   No   Do you have a long-term health problem with heart disease, lung disease, asthma, kidney disease, metabolic disease (e.g. diabetes), anemia, or other blood disorder?   No   Do you have cancer, leukemia, HIV/AIDS, or any other immune system problem?   No   In the past 3 months, have you taken medications that affect  your immune system, such as prednisone, other steroids, or anticancer drugs; drugs for the treatment of rheumatoid arthritis, Crohn s disease, or psoriasis; or have you had radiation treatments?   No   Have you had a seizure, or a brain or other nervous system problem?   No   During the past year, have you received a transfusion of blood or blood     products, or been given immune (gamma) globulin or antiviral drug?   No   For women: Are you pregnant or is there a chance you could become        pregnant during the next month?   No   Have you received any vaccinations in the past 4 weeks?   No     Immunization questionnaire answers were all negative.        Per orders of Brianna Washington, injection of Adacel given by Theresa Hooks. Patient instructed to remain in clinic for 15 minutes afterwards, and to report any adverse reaction to me immediately.       Screening performed by Theresa Hooks on 6/6/2019 at 4:08 PM.

## 2019-08-13 ENCOUNTER — OFFICE VISIT (OUTPATIENT)
Dept: FAMILY MEDICINE | Facility: CLINIC | Age: 39
End: 2019-08-13
Payer: COMMERCIAL

## 2019-08-13 VITALS — SYSTOLIC BLOOD PRESSURE: 102 MMHG | DIASTOLIC BLOOD PRESSURE: 63 MMHG | TEMPERATURE: 98.6 F

## 2019-08-13 DIAGNOSIS — Z71.84 TRAVEL ADVICE ENCOUNTER: Primary | ICD-10-CM

## 2019-08-13 PROCEDURE — 90471 IMMUNIZATION ADMIN: CPT | Mod: GA | Performed by: NURSE PRACTITIONER

## 2019-08-13 PROCEDURE — 90632 HEPA VACCINE ADULT IM: CPT | Mod: GA | Performed by: NURSE PRACTITIONER

## 2019-08-13 PROCEDURE — 90691 TYPHOID VACCINE IM: CPT | Mod: GA | Performed by: NURSE PRACTITIONER

## 2019-08-13 PROCEDURE — 99402 PREV MED CNSL INDIV APPRX 30: CPT | Mod: 25 | Performed by: NURSE PRACTITIONER

## 2019-08-13 PROCEDURE — 90707 MMR VACCINE SC: CPT | Mod: GA | Performed by: NURSE PRACTITIONER

## 2019-08-13 PROCEDURE — 90472 IMMUNIZATION ADMIN EACH ADD: CPT | Mod: GA | Performed by: NURSE PRACTITIONER

## 2019-08-13 PROCEDURE — 90746 HEPB VACCINE 3 DOSE ADULT IM: CPT | Mod: GA | Performed by: NURSE PRACTITIONER

## 2019-08-13 RX ORDER — AZITHROMYCIN 500 MG/1
500 TABLET, FILM COATED ORAL DAILY
Qty: 3 TABLET | Refills: 0 | Status: SHIPPED | OUTPATIENT
Start: 2019-08-13 | End: 2019-08-16

## 2019-08-13 RX ORDER — ATOVAQUONE AND PROGUANIL HYDROCHLORIDE 250; 100 MG/1; MG/1
1 TABLET, FILM COATED ORAL DAILY
Qty: 25 TABLET | Refills: 0 | Status: SHIPPED | OUTPATIENT
Start: 2019-08-13 | End: 2020-01-20

## 2019-08-13 NOTE — NURSING NOTE
"Chief Complaint   Patient presents with     [facility]     initial /63 (BP Location: Right arm, Cuff Size: Adult Regular)   Temp 98.6  F (37  C) (Oral)   LMP 08/08/2019  Estimated body mass index is 26.55 kg/m  as calculated from the following:    Height as of 6/6/19: 1.568 m (5' 1.75\").    Weight as of 6/6/19: 65.3 kg (144 lb).  BP completed using cuff size: regular.  R arm      Health Maintenance that is potentially due pending provider review:  NONE    n/a    Gian Rivas ma  "

## 2019-08-13 NOTE — PROGRESS NOTES
Nurse Note      Itinerary:  [place]      Departure Date: 8/29/19      Return Date: 9/15/19      Length of Trip 2 weeks      Reason for Travel: Tourism           Urban or rural: both      Accommodations: Hotel    Camping        IMMUNIZATION HISTORY  Have you received any immunizations within the past 4 weeks?  No  Have you ever fainted from having your blood drawn or from an injection?  No  Have you ever had a fever reaction to vaccination?  No  Have you ever had any bad reaction or side effect from any vaccination?  No  Have you ever had hepatitis A or B vaccine?  No  Do you live (or work closely) with anyone who has AIDS, an AIDS-like condition, any other immune disorder or who is on chemotherapy for cancer?  No  Do you have a family history of immunodeficiency?  No  Have you received any injection of immune globulin or any blood products during the past 12 months?  No    Patient roomed by Gian Francisco  Brianna Han is a 38 year old female seen today alone for counsultation for international travel to above for Tourism.  Patient will be departing in  2-3 week(s) and  traveling with organized tour group.      Patient itinerary :  [place] in the [place] > [place]> [facility] > [place] > [facility] > [place]> [place] > [place] [facility] > [place]  which presents risk for Malaria. exposure.      Patient's activities will include sightseeing and safari/game [place].    Patient's country of birth is [place]    Special medical concerns: none ( Back pain history)   Pre-travel questionnaire was completed by patient and reviewed by provider.     Vitals: /63 (BP Location: Right arm, Cuff Size: Adult Regular)   Temp 98.6  F (37  C) (Oral)   LMP 08/08/2019   BMI= There is no height or weight on file to calculate BMI.    EXAM:  General:  Well-nourished, well-developed in no acute distress.  Appears to be stated age, interacts appropriately and expresses  understanding of information given to patient.    Current Outpatient Medications   Medication Sig Dispense Refill     levonorgestrel-ethinyl estradiol (ORSYTHIA) 0.1-20 MG-MCG tablet Take 1 tablet by mouth daily 84 tablet PRN     tiZANidine (ZANAFLEX) 2 MG tablet Take 1-2 tablets (2-4 mg) by mouth 3 times daily as needed for muscle spasms (Patient not taking: Reported on 6/6/2019) 30 tablet 1     Patient Active Problem List   Diagnosis     CARDIOVASCULAR SCREENING; LDL GOAL LESS THAN 160     IBS (irritable bowel syndrome)     Encounter for other general counseling or advice on contraception     Nonallopathic lesion of sacral region     Nonallopathic lesion of lower extremities     Pain in joint, lower leg     Family circumstance     Allergies   Allergen Reactions     Iodine Hives     Latex          Immunizations discussed include:   Hepatitis A:  Ordered/given today, risks, benefits and side effects reviewed  Hepatitis B: Ordered given today started  Influenza: vaccine is not available  Typhoid: Ordered/given today, risks, benefits and side effects reviewed  Rabies: Insufficient time to vaccinate  Yellow Fever: Not indicated  Japanese Encephalitis: Not indicated  Meningococcus: Not indicated  Tetanus/Diphtheria: Up to date  Measles/Mumps/Rubella: Ordered/given today  Cholera: Not needed  Polio: Up to date  Pneumococcal: Under age of 65  Varicella: Immune by disease history per patient report  Zostavax:  Not indicated  Shingrix: Not indicated  HPV:  Up to date  TB:  Low risk    Altitude Exposure on this trip: no  Past tolerance to Altitude: na    ASSESSMENT/PLAN:  No diagnosis found.  I have reviewed general recommendations for safe travel   including: food/water precautions, insect precautions, safer sex   practices given high prevalence of Zika, HIV and other STDs,   roadway safety. Educational materials and [employer] report provided.    Malaraia prophylaxis recommended: [place]arone  Symptomatic treatment for traveler's  diarrhea: azithromycin  Altitude illness prevention and treatment: none      Evacuation insurance advised and resources were provided to patient.    Total visit time 30 minutes  with over 50% of time spent counseling patient as detailed above.    Juanis Delaney CNP

## 2019-08-13 NOTE — PATIENT INSTRUCTIONS
Today August 13, 2019 you received the    Hepatitis A Vaccine - Please return on 2/9/20 or later for your 2nd and final dose.    Hepatitis B Vaccine - Please return on 9/12/19 for your 2nd dose and 2/9/20 or later for your 3rd and final dose.    MMR (Measles Mumps Rubella) Vaccine    Typhoid - injectable. This vaccine is valid for two years.   .    These appointments can be made as a NURSE ONLY visit.    **It is very important for the vaccinations to be given on the scheduled day(s), this helps ensure you receive the full effectiveness of the vaccine.**    Please call [facility] with any questions [phone]    Thank you for visiting [facility]

## 2019-10-28 NOTE — PROGRESS NOTES
Patient did not return for further treatment and no additional progress was noted.  Please refer to the progress note and goal flowsheet completed on 1/14/2019 for discharge information.

## 2019-10-29 DIAGNOSIS — Z01.419 ENCOUNTER FOR GYNECOLOGICAL EXAMINATION WITHOUT ABNORMAL FINDING: ICD-10-CM

## 2019-10-29 PROCEDURE — 36415 COLL VENOUS BLD VENIPUNCTURE: CPT | Performed by: NURSE PRACTITIONER

## 2019-10-29 PROCEDURE — 87389 HIV-1 AG W/HIV-1&-2 AB AG IA: CPT | Performed by: NURSE PRACTITIONER

## 2019-10-31 LAB — HIV 1+2 AB+HIV1 P24 AG SERPL QL IA: NONREACTIVE

## 2019-11-07 ENCOUNTER — TELEPHONE (OUTPATIENT)
Dept: FAMILY MEDICINE | Facility: CLINIC | Age: 39
End: 2019-11-07

## 2019-11-07 NOTE — TELEPHONE ENCOUNTER
Reason for call:  Other   Patient called regarding (reason for call):Lab results    Additional comments: Patient has sent 2 [employer] requests for lab results from 10/29. Please reply to patient via [employer]    Phone number to reach patient:      Best Time:      Can we leave a detailed message on this number?

## 2019-11-07 NOTE — TELEPHONE ENCOUNTER
Hazel,    I did tell pt her HIV was negative . Is there anything else you would like to tell her about this?    Evelyn Mancini, RN, BSN

## 2019-11-07 NOTE — TELEPHONE ENCOUNTER
Pt told by [employer] that her HIV from 10/29/19 was negative.  As noted below, she said she sent two [employer] messages about this. These never came through.    Evelyn Mancini RN, BSN

## 2019-11-08 ENCOUNTER — HEALTH MAINTENANCE LETTER (OUTPATIENT)
Age: 39
End: 2019-11-08

## 2019-11-25 DIAGNOSIS — Z30.41 ENCOUNTER FOR SURVEILLANCE OF CONTRACEPTIVE PILLS: ICD-10-CM

## 2019-11-25 RX ORDER — LEVONORGESTREL/ETHIN.ESTRADIOL 0.1-0.02MG
1 TABLET ORAL DAILY
Qty: 84 TABLET | Refills: 1 | Status: SHIPPED | OUTPATIENT
Start: 2019-11-25 | End: 2020-06-22

## 2019-11-25 NOTE — TELEPHONE ENCOUNTER
"Prescription approved per [facility] Refill Protocol.  Dinorah Jenkins RN      Last Office Visit: 8/13/2019   Future Office Visit:  None      Requested Prescriptions   Pending Prescriptions Disp Refills     levonorgestrel-ethinyl estradiol (ORSYTHIA) 0.1-20 MG-MCG tablet 84 tablet PRN     Sig: Take 1 tablet by mouth daily       Contraceptives Protocol Passed - 11/25/2019 10:31 AM        Passed - Patient is not a current smoker if age is 35 or older        Passed - Recent (12 mo) or future (30 days) visit within the authorizing provider's specialty     Patient has had an office visit with the authorizing provider or a provider within the authorizing providers department within the previous 12 mos or has a future within next 30 days. See \"Patient Info\" tab in inbasket, or \"Choose Columns\" in Meds & Orders section of the refill encounter.              Passed - Medication is active on med list        Passed - No active pregnancy on record        Passed - No positive pregnancy test in past 12 months          Warnings Override History for levonorgestrel-ethinyl estradiol (ORSYTHIA) 0.1-20 MG-MCG tablet [[id number]]     Overridden by Brianna Washington NP on Aug 14, 2019 3:24 PM   Drug-Drug   1. ORAL CONTRACEPTIVES / TIZANIDINE [Level: Moderate]   Other Orders: tiZANidine (ZANAFLEX) 2 MG tablet         Overridden by Brianna Washington NP on Aug 14, 2019 3:24 PM   Drug-Drug   1. ORAL CONTRACEPTIVES / TIZANIDINE [Level: Moderate]   Other Orders: tiZANidine (ZANAFLEX) 2 MG tablet         Overridden by Brianna Washington NP on Jun 6, 2019 3:50 PM   Drug-Drug   1. ORAL CONTRACEPTIVES / TIZANIDINE [Level: Moderate]   Other Orders: tiZANidine (ZANAFLEX) 2 MG tablet          "

## 2019-12-23 ENCOUNTER — ALLIED HEALTH/NURSE VISIT (OUTPATIENT)
Dept: NURSING | Facility: CLINIC | Age: 39
End: 2019-12-23
Payer: COMMERCIAL

## 2019-12-23 DIAGNOSIS — Z23 NEED FOR PROPHYLACTIC VACCINATION AND INOCULATION AGAINST INFLUENZA: Primary | ICD-10-CM

## 2019-12-23 PROCEDURE — 90686 IIV4 VACC NO PRSV 0.5 ML IM: CPT

## 2019-12-23 PROCEDURE — 90471 IMMUNIZATION ADMIN: CPT

## 2019-12-23 PROCEDURE — 90746 HEPB VACCINE 3 DOSE ADULT IM: CPT

## 2019-12-23 PROCEDURE — 90472 IMMUNIZATION ADMIN EACH ADD: CPT

## 2019-12-23 NOTE — NURSING NOTE
Clinic Administered Medication Documentation    MEDICATION LIST:   Injectable Medication Documentation    Patient was given Flu and Hep B. Prior to medication administration, verified patients identity using patient s name and date of birth. Please see MAR and medication order for additional information. Patient instructed to remain in clinic for 15 minutes and report any adverse reaction to staff immediately .      Was entire vial of medication used? Yes  Vial/Syringe: Single dose vial  Expiration Date:  Please refer to immunization tab   Was this medication supplied by the patient? No     Prior to immunization administration, verified patients identity using patient s name and date of birth. Please see Immunization Activity for additional information.     Screening Questionnaire for Adult Immunization    Are you sick today?   No   Do you have allergies to medications, food, a vaccine component or latex?   latex   Have you ever had a serious reaction after receiving a vaccination?   No   Do you have a long-term health problem with heart, lung, kidney, or metabolic disease (e.g., diabetes), asthma, a blood disorder, no spleen, complement component deficiency, a cochlear implant, or a spinal fluid leak?  Are you on long-term aspirin therapy?   No   Do you have cancer, leukemia, HIV/AIDS, or any other immune system problem?   No   Do you have a parent, brother, or sister with an immune system problem?   No   In the past 3 months, have you taken medications that affect  your immune system, such as prednisone, other steroids, or anticancer drugs; drugs for the treatment of rheumatoid arthritis, Crohn s disease, or psoriasis; or have you had radiation treatments?   No   Have you had a seizure, or a brain or other nervous system problem?   No   During the past year, have you received a transfusion of blood or blood    products, or been given immune (gamma) globulin or antiviral drug?   No   For women: Are you pregnant or  is there a chance you could become       pregnant during the next month?   No   Have you received any vaccinations in the past 4 weeks?   No     Immunization questionnaire answers were all negative.        Per orders of Kathleen Bartholomew, injection of Hep B and Flu given by Balta Hopkins. Patient instructed to remain in clinic for 15 minutes afterwards, and to report any adverse reaction to me immediately.    Questionnaire completed by patient.    Balta Hopkins MA

## 2020-01-20 ENCOUNTER — OFFICE VISIT (OUTPATIENT)
Dept: FAMILY MEDICINE | Facility: CLINIC | Age: 40
End: 2020-01-20
Payer: COMMERCIAL

## 2020-01-20 VITALS
HEIGHT: 63 IN | WEIGHT: 144.8 LBS | OXYGEN SATURATION: 95 % | BODY MASS INDEX: 25.66 KG/M2 | TEMPERATURE: 98.1 F | HEART RATE: 97 BPM

## 2020-01-20 DIAGNOSIS — M54.50 ACUTE RIGHT-SIDED LOW BACK PAIN WITHOUT SCIATICA: Primary | ICD-10-CM

## 2020-01-20 PROCEDURE — 99214 OFFICE O/P EST MOD 30 MIN: CPT | Performed by: FAMILY MEDICINE

## 2020-01-20 RX ORDER — COVID-19 ANTIGEN TEST
220 KIT MISCELLANEOUS 2 TIMES DAILY PRN
COMMUNITY
End: 2022-11-20

## 2020-01-20 RX ORDER — TIZANIDINE HYDROCHLORIDE 2 MG/1
2-4 CAPSULE, GELATIN COATED ORAL 3 TIMES DAILY
Qty: 30 CAPSULE | Refills: 1 | Status: SHIPPED | OUTPATIENT
Start: 2020-01-20 | End: 2020-11-09

## 2020-01-20 ASSESSMENT — MIFFLIN-ST. JEOR: SCORE: 1300.94

## 2020-01-20 NOTE — PROGRESS NOTES
Subjective:   Brianna Han is a 39 year old female who presents for   Chief Complaint   Patient presents with     Back Pain     disc issue couple months, pins and needle feeling in lower back from snow clearing early yesterday. Aleeve and ibuprofen. Numb down through right leg. Did not check BP as she is in paing when sitting.      In the past she has done physical therapy previously  Fall of 2018 she reports 3 months physical therapy, when going out to do sports/high interval workout she ended up hurting herself.   This particular episode started right around Thanksgiving, she has limited her activity to walking on the treadmill. Has been doing home physical therapy and stretching which has gotten her by. Was just experiencing slight discomfort in her lower back.   3 activities this last weekend I)During this recent snowstorm she also had to reach and cleared off snow on her car ii) helping friend clean down her kitchen iii) playing a bar game with friend    She has tried aleve/ibuprofen for pain. Last night she went to bed and had experienced pain in the R lower back radiating down the back of her leg. Did  some bengay to try then later on developed numbness in calf area and top of foot, which may be improving slightly.   No issues with bowel/bladder. No saddle anesthesia.       Patient Active Problem List    Diagnosis Date Noted     Family circumstance 09/21/2011     Priority: Medium     Nonallopathic lesion of sacral region 03/07/2011     Priority: Medium     Problem list name updated by automated process. Provider to review       Nonallopathic lesion of lower extremities 03/07/2011     Priority: Medium     Problem list name updated by automated process. Provider to review       Pain in joint, lower leg 03/07/2011     Priority: Medium     Encounter for other general counseling or advice on contraception 12/10/2010     Priority: Medium     Diagnosis updated by automated process. Provider to review and  "confirm.       IBS (irritable bowel syndrome) 11/01/2010     Priority: Medium     CARDIOVASCULAR SCREENING; LDL GOAL LESS THAN 160 10/31/2010     Priority: Medium       Current Outpatient Medications   Medication     naproxen sodium 220 MG capsule     tiZANidine (ZANAFLEX) 2 MG capsule     levonorgestrel-ethinyl estradiol (ORSYTHIA) 0.1-20 MG-MCG tablet     tiZANidine (ZANAFLEX) 2 MG tablet     No current facility-administered medications for this visit.        ROS:  As above per HPI    Objective:   Pulse 97   Temp 98.1  F (36.7  C) (Oral)   Ht 1.6 m (5' 3\")   Wt 65.7 kg (144 lb 12.8 oz)   LMP 01/14/2020 (Exact Date)   SpO2 95%   BMI 25.65 kg/m  , Body mass index is 25.65 kg/m .  Gen:  NAD, well-nourished, sitting in chair comfortably  HEENT: EOMI, sclera anicteric, Head normocephalic, ; nares patent; moist mucous membranes  Neck: trachea midline, no thyromegaly  CV:  Hemodynamically stable, RRR  Pulm:  no increased work of breathing , CTAB, no wheezes/rales/rhonchi   Extrem: no cyanosis, edema or clubbing  Skin: no obvious rashes or abnormalities  Psych: Euthymic, linear thoughts, normal rate of speech  Gait: antalgic , stable  Back: , able to extend back to 30 degrees, forward flexion to 90 degrees causes minor discomfort  MSK: modified straight leg test negative bilaterally, able to extend leg to 70 degrees bilaterally, discomfort and muscle tightness palpable over right SI joint region  No results found for any visits on 01/20/20.    Previous xrays at [facility]:   EXAM: XR LUMBAR SPINE 3 VIEWS  LOCATION: [facility]  DATE/TIME: 1/18/2019 9:52 AM  INDICATION: Left-sided lower back pain with radiation down left leg  COMPARISON: None.  FINDINGS: 5 lumbar type vertebra. Mild lumbar dextrocurvature and straightened lumbar lordosis. No fracture or subluxation. Mild lumbar spondylosis with mild loss of disc height at L2-3, L4-5, and L5-S1. Minor associated endplate spurring.    Assessment & " Plan:   Brianna Han, 39 year old female who presents with:    Acute right-sided low back pain without sciatica  Trial of muscle relaxant to help enable patient to be moderately active. Discussed brief use of NSAIDs/tylenol used together is appropriate. Physical therapy referral placed at her request as her [facility] one at [facility] has . No signs of cauda equina syndrome.   - tiZANidine (ZANAFLEX) 2 MG capsule  Dispense: 30 capsule; Refill: 1  - PHYSICAL THERAPY REFERRAL      Mich Stewart MD   [place] [facility]    The use of Dragon/[employer] dictation services may have been used to construct the content in this note; any grammatical or spelling errors are non-intentional. Please contact the author of this note directly if you are in need of any clarification.

## 2020-01-20 NOTE — PATIENT INSTRUCTIONS
Tizanidine 2-4 mg every 8 hours for muscle spasms/tightness (careful for dizziness/sedation)     Schedule appointment to see physical therapy    Try to stay upright and moderately active is preferred over prolonged rest/laying    Aleve 220 mg every 12 hours as needed for pain ; okay to take with additional tylenol 500-650mg every 4-6 hours    Drink 60 ounces of water a day to stay hydrated

## 2020-01-28 ENCOUNTER — THERAPY VISIT (OUTPATIENT)
Dept: PHYSICAL THERAPY | Facility: CLINIC | Age: 40
End: 2020-01-28
Attending: FAMILY MEDICINE
Payer: COMMERCIAL

## 2020-01-28 DIAGNOSIS — M54.41 RIGHT-SIDED LOW BACK PAIN WITH RIGHT-SIDED SCIATICA: ICD-10-CM

## 2020-01-28 DIAGNOSIS — M54.50 ACUTE RIGHT-SIDED LOW BACK PAIN WITHOUT SCIATICA: ICD-10-CM

## 2020-01-28 PROCEDURE — 97110 THERAPEUTIC EXERCISES: CPT | Mod: GP | Performed by: PHYSICAL THERAPIST

## 2020-01-28 PROCEDURE — 97162 PT EVAL MOD COMPLEX 30 MIN: CPT | Mod: GP | Performed by: PHYSICAL THERAPIST

## 2020-01-29 PROBLEM — M54.41 RIGHT-SIDED LOW BACK PAIN WITH RIGHT-SIDED SCIATICA: Status: ACTIVE | Noted: 2020-01-29

## 2020-01-29 NOTE — PROGRESS NOTES
[facility] Initial Evaluation    Subjective:  Pt presents to PT with a chief complaint of R sided LBP with radiating numbness/tingling into her R lower leg and foot with reported exacerbation ~2 weeks ago after cleaning snow off her car and playing an arcade game ([employer]). Pt reports a previous exacerbation of her R sided LBP when training for a marathon and weight lifting in 10/2019. Pain has improved considerably over the past week, but patient continues to have tingling/numbness in her R calf and foot.       Brianna Han being seen for R sided LBP.   Date of Onset: 10/2019; worse on 1/18/2020. Where condition occurred: during recreation / sport and in the community.HPI: Documentation: cleaning car/playing arcade game.  and reported as 2/10 on pain scale. General health as reported by patient is excellent. Pertinent medical history includes:  Asthma and depression.  Medical allergies: latex.  Surgeries: breast reduction   Current medications:  Muscle relaxants.   Primary job tasks include:  Computer work.  Pain is described as aching and is intermittent. Pain is worse during the night. Since onset symptoms are gradually improving. Special tests:  X-ray. Previous treatment includes physical therapy. There was moderate improvement following previous treatment.   Occupation: . Restrictions include:  Working in normal job without restrictions.    Barriers include:  None as reported by patient.  Red flags:  None as reported by patient.  Type of problem:  Lumbar   Condition occurred with:  Twisting and lifting. This is a recurrent condition   Problem details: 1/18/20.   Patient reports pain:  SI joint right. Radiates to:  Gluteals right, lower leg right and foot right. Associated symptoms:  Loss of strength, numbness and tingling. Symptoms are exacerbated by bending and lifting and relieved by muscle relaxants.                      Objective:  System         Lumbar/SI  Evaluation  ROM:    AROM Lumbar:   Flexion:          Min loss, pain + R gluteal  Ext:                    Min loss, pain R SI   Side Bend:        Left:     Right:   Rotation:           Left:  Min loss    Right:  WNL  Side Glide:        Left:     Right:           Lumbar Myotomes:    T12-L3 (Hip Flex):  Left: 5    Right: 5  L2-4 (Quads):  Left:  5    Right:  5  L4 (Ankle DF):  Left:  5    Right:  4  L5 (Great Toe Ext): Left: 5    Right: 4-   S1 (Toe Raise):  Left: 5    Right: 5  Lumbar DTR's:    L4 (Quad):  Left:  2   Right:  2  S1 (Achilles):  Left:  2   Right:  2  Cord Signs:      Cord sign negative:  Babinksi's left or Babinski's right  Lumbar Dermtomes:              L5 Right:  Hypo-light touch    Neural Tension/Mobility:      Left side:SLR w/DF or Slump  negative.     Right side:   SLR w/DF or Slump  negative.   Lumbar Palpation:      Tenderness present at Right: Piriformis      Spinal Segmental Conclusions:     Level: Hypo noted at L4                                                   General     ROS    Assessment/Plan:    Patient is a 39 year old female with lumbar complaints.  Patient exhibits myotomal weakness at L4-5 on the right. PT to notify referring MD and continue to monitor symptoms w/ plans to refer to ortho if sxs worsen.   Patient has the following significant findings with corresponding treatment plan.                Diagnosis 1:  R sided LBP w/ R sided Sciatica  Pain -  manual therapy, splint/taping/bracing/orthotics, self management, education and directional preference exercise  Decreased ROM/flexibility - manual therapy and therapeutic exercise  Decreased strength - therapeutic exercise and therapeutic activities  Impaired muscle performance - neuro re-education    Therapy Evaluation Codes:   1) History comprised of:   Personal factors that impact the plan of care:      Past/current experiences.    Comorbidity factors that impact the plan of care are:      Depression.     Medications impacting  care: None.  2) Examination of Body Systems comprised of:   Body structures and functions that impact the plan of care:      Lumbar spine.   Activity limitations that impact the plan of care are:      Bending, Cooking, Dressing and Lifting.  3) Clinical presentation characteristics are:   Evolving/Changing.  4) Decision-Making    Moderate complexity using standardized patient assessment instrument and/or measureable assessment of functional outcome.  Cumulative Therapy Evaluation is: Moderate complexity.    Previous and current functional limitations:  (See Goal Flow Sheet for this information)    Short term and Long term goals: (See Goal Flow Sheet for this information)     Communication ability:  Patient appears to be able to clearly communicate and understand verbal and written communication and follow directions correctly.  Treatment Explanation - The following has been discussed with the patient:   RX ordered/plan of care  Anticipated outcomes  Possible risks and side effects  This patient would benefit from PT intervention to resume normal activities.   Rehab potential is good.    Frequency:  1 X week, once daily  Duration:  for 6 weeks  Discharge Plan:  Achieve all LTG.  Independent in home treatment program.  Reach maximal therapeutic benefit.    Please refer to the daily flowsheet for treatment today, total treatment time and time spent performing 1:1 timed codes.

## 2020-02-06 ENCOUNTER — THERAPY VISIT (OUTPATIENT)
Dept: PHYSICAL THERAPY | Facility: CLINIC | Age: 40
End: 2020-02-06
Payer: COMMERCIAL

## 2020-02-06 DIAGNOSIS — M54.41 RIGHT-SIDED LOW BACK PAIN WITH RIGHT-SIDED SCIATICA: ICD-10-CM

## 2020-02-06 PROCEDURE — 97112 NEUROMUSCULAR REEDUCATION: CPT | Mod: GP | Performed by: PHYSICAL THERAPIST

## 2020-02-06 PROCEDURE — 97110 THERAPEUTIC EXERCISES: CPT | Mod: GP | Performed by: PHYSICAL THERAPIST

## 2020-02-14 ENCOUNTER — THERAPY VISIT (OUTPATIENT)
Dept: PHYSICAL THERAPY | Facility: CLINIC | Age: 40
End: 2020-02-14
Payer: COMMERCIAL

## 2020-02-14 DIAGNOSIS — M54.41 RIGHT-SIDED LOW BACK PAIN WITH RIGHT-SIDED SCIATICA: ICD-10-CM

## 2020-02-14 PROCEDURE — 97110 THERAPEUTIC EXERCISES: CPT | Mod: GP | Performed by: PHYSICAL THERAPIST

## 2020-02-14 PROCEDURE — 97112 NEUROMUSCULAR REEDUCATION: CPT | Mod: GP | Performed by: PHYSICAL THERAPIST

## 2020-03-03 ENCOUNTER — THERAPY VISIT (OUTPATIENT)
Dept: PHYSICAL THERAPY | Facility: CLINIC | Age: 40
End: 2020-03-03
Payer: COMMERCIAL

## 2020-03-03 DIAGNOSIS — M54.41 RIGHT-SIDED LOW BACK PAIN WITH RIGHT-SIDED SCIATICA: ICD-10-CM

## 2020-03-03 PROCEDURE — 97012 MECHANICAL TRACTION THERAPY: CPT | Mod: GP | Performed by: PHYSICAL THERAPIST

## 2020-03-03 PROCEDURE — 97110 THERAPEUTIC EXERCISES: CPT | Mod: GP | Performed by: PHYSICAL THERAPIST

## 2020-03-10 ENCOUNTER — THERAPY VISIT (OUTPATIENT)
Dept: PHYSICAL THERAPY | Facility: CLINIC | Age: 40
End: 2020-03-10
Payer: COMMERCIAL

## 2020-03-10 DIAGNOSIS — M54.41 RIGHT-SIDED LOW BACK PAIN WITH RIGHT-SIDED SCIATICA: ICD-10-CM

## 2020-03-10 PROCEDURE — 97110 THERAPEUTIC EXERCISES: CPT | Mod: GP | Performed by: PHYSICAL THERAPIST

## 2020-04-07 ENCOUNTER — VIRTUAL VISIT (OUTPATIENT)
Dept: PHYSICAL THERAPY | Facility: CLINIC | Age: 40
End: 2020-04-07
Payer: COMMERCIAL

## 2020-04-07 DIAGNOSIS — M54.41 RIGHT-SIDED LOW BACK PAIN WITH RIGHT-SIDED SCIATICA: ICD-10-CM

## 2020-04-07 PROCEDURE — 97110 THERAPEUTIC EXERCISES: CPT | Mod: 95 | Performed by: PHYSICAL THERAPIST

## 2020-04-07 NOTE — PROGRESS NOTES
"Physical Therapy Virtual Follow Up Visit      The patient has been notified of following:     \"This virtual visit will be conducted between you and your provider. We have found that certain health care needs can be provided without the need for physical presence.  This service lets us provide the care you need with a virtual visit.\"    Due to external, as well as internal [facility] management of the COVID-19 Virus, Brianna Han was not seen in our clinic.  As a substitution, we implemented a virtual visit to manage this patient's condition utilizing the PTRx virtual visit platform via the patient s existing code.  The provider, [place], reviewed the patient's chart, PTRx prescription, and spoke with the patient to determine the following telemedicine visit is appropriate and effective for the patient's care.    The following type of visit was completed:   Telephone Visit:  A telephone visit was used in conjunction with the online PTRx exercise platform.        S: Brianna Han is a 39 year old female. Connected virtually on the PTRx platform to discuss their condition/progress. They noted improvements in gluteal pain and foot/toe strength. Feels ~80-90% improved in her foot strength and would like to continue independently with HEP and f/u as needed      They noted ongoing pain or limitations with prolonged sitting which has worsened with home work restrictions due to COVID-19.     Current pain level: 2/10 after prolonged sitting    O: Patient demonstrated WFL Lumbar AROM per patient w/ min pain during lumbar flexion and R rotation     PTRx Content from today's visit:  Exercise Name: Prone Press Ups, Sets: 1 set - Reps: 10 reps - Sessions: 4-5x day  Exercise Name: Foot Yoga, Sets: 1 - Reps: 20 - Sessions: 2x day   Exercise Name: Reverse Toe Raises, Sets: 1  - Reps: 20 reps  - Sessions: 2x day , Notes: sitting as well   Exercise Name: Piriformis Stretch Above 90 Degress Supine, Sets: 1 set  - " Reps: 15 sec holds, 4 reps - Sessions: 2x day  Exercise Name: Supine Abdominal Exercise #5 (Arm and Leg Extension), Sets: 2 - Reps: 8-12 reps each leg - Sessions: 3x week   Exercise Name: Supine Abdominal Exercise #6 (Dead Bug), Sets: 2  - Reps: 10-12 reps  - Sessions: 3x week , Notes: Progress to as able   Exercise Name: Supine Abdominal Exercise #7A (Arm Extension with Legs at 90/90), Sets: 2  - Reps: 10 reps - Sessions: 3x week  , Notes: Progress to as able   Exercise Name: Prone Plank, Sets: 1 set - Reps: 2x  until fatigue/compensation occurs) - Sessions: 3x week   Exercise Name: Ball Exercise Seated Pelvic Tilt, Sets: 2  - Reps: 10 reps  - Sessions: 3x week   Exercise Name: Ball Exercise Seated Marching, Sets: 2  - Reps: 10 reps  - Sessions: 3x week   Exercise Name: Ball Exercise Supine Bridging, Sets: 2  - Reps: 10 reps  - Sessions: 3x week    A:   Patient is progressing as expected.  Response to therapy has shown an improvement in  radicular symptoms and muscle control      P: Patient will continue with the exercise program assigned on their PTRx code and will add the following measures to manage their pain/condition: core stabilization     Current treatment program is being advanced to more complex exercises.      Virtual visit contact time    Time of service began: 4:40 PM  Time of service ended: 5:05 PM  Total Time for set up, visit, and documentation: 30 minutes    Payor: BCBS / Plan: BCBS OUT OF STATE / Product Type: Indemnity /   .  Procedure Code/s   Therapeutic Exercise (24904): 15 minutes    I have reviewed the note as documented above.  This accurately captures the substance of my conversation with the patient.  Provider location: [facility] ([facility]State)  Patient location: home    ___________________________________________________    Any subjective info about the quality of the visit, ease of use: appropriate for this time  Patient's opinion about reasonable cost for this visit n/a

## 2020-06-21 DIAGNOSIS — Z30.41 ENCOUNTER FOR SURVEILLANCE OF CONTRACEPTIVE PILLS: ICD-10-CM

## 2020-06-22 ENCOUNTER — MYC MEDICAL ADVICE (OUTPATIENT)
Dept: FAMILY MEDICINE | Facility: CLINIC | Age: 40
End: 2020-06-22

## 2020-06-22 RX ORDER — LEVONORGESTREL/ETHIN.ESTRADIOL 0.1-0.02MG
1 TABLET ORAL DAILY
Qty: 84 TABLET | Refills: 0 | Status: SHIPPED | OUTPATIENT
Start: 2020-06-22 | End: 2020-09-04

## 2020-09-04 ENCOUNTER — OFFICE VISIT (OUTPATIENT)
Dept: FAMILY MEDICINE | Facility: CLINIC | Age: 40
End: 2020-09-04
Payer: COMMERCIAL

## 2020-09-04 VITALS
RESPIRATION RATE: 16 BRPM | OXYGEN SATURATION: 98 % | BODY MASS INDEX: 26.22 KG/M2 | HEIGHT: 63 IN | WEIGHT: 148 LBS | TEMPERATURE: 98 F | HEART RATE: 51 BPM | SYSTOLIC BLOOD PRESSURE: 112 MMHG | DIASTOLIC BLOOD PRESSURE: 66 MMHG

## 2020-09-04 DIAGNOSIS — Z13.220 SCREENING FOR HYPERLIPIDEMIA: ICD-10-CM

## 2020-09-04 DIAGNOSIS — Z00.00 ROUTINE GENERAL MEDICAL EXAMINATION AT A HEALTH CARE FACILITY: Primary | ICD-10-CM

## 2020-09-04 DIAGNOSIS — Z13.0 SCREENING FOR IRON DEFICIENCY ANEMIA: ICD-10-CM

## 2020-09-04 DIAGNOSIS — Z30.41 ENCOUNTER FOR SURVEILLANCE OF CONTRACEPTIVE PILLS: ICD-10-CM

## 2020-09-04 DIAGNOSIS — Z13.29 SCREENING FOR THYROID DISORDER: ICD-10-CM

## 2020-09-04 DIAGNOSIS — Z13.21 ENCOUNTER FOR VITAMIN DEFICIENCY SCREENING: ICD-10-CM

## 2020-09-04 DIAGNOSIS — R42 DIZZINESS: ICD-10-CM

## 2020-09-04 DIAGNOSIS — Z23 NEED FOR PROPHYLACTIC VACCINATION AND INOCULATION AGAINST INFLUENZA: ICD-10-CM

## 2020-09-04 DIAGNOSIS — Z13.1 SCREENING FOR DIABETES MELLITUS: ICD-10-CM

## 2020-09-04 LAB
CHOLEST SERPL-MCNC: 162 MG/DL
GLUCOSE SERPL-MCNC: 76 MG/DL (ref 70–99)
HDLC SERPL-MCNC: 52 MG/DL
HGB BLD-MCNC: 12.6 G/DL (ref 11.7–15.7)
LDLC SERPL CALC-MCNC: 86 MG/DL
NONHDLC SERPL-MCNC: 110 MG/DL
TRIGL SERPL-MCNC: 118 MG/DL
TSH SERPL DL<=0.005 MIU/L-ACNC: 3.21 MU/L (ref 0.4–4)

## 2020-09-04 PROCEDURE — 84443 ASSAY THYROID STIM HORMONE: CPT | Performed by: NURSE PRACTITIONER

## 2020-09-04 PROCEDURE — 90686 IIV4 VACC NO PRSV 0.5 ML IM: CPT | Performed by: NURSE PRACTITIONER

## 2020-09-04 PROCEDURE — 80061 LIPID PANEL: CPT | Performed by: NURSE PRACTITIONER

## 2020-09-04 PROCEDURE — 36415 COLL VENOUS BLD VENIPUNCTURE: CPT | Performed by: NURSE PRACTITIONER

## 2020-09-04 PROCEDURE — 90471 IMMUNIZATION ADMIN: CPT | Performed by: NURSE PRACTITIONER

## 2020-09-04 PROCEDURE — 85018 HEMOGLOBIN: CPT | Performed by: NURSE PRACTITIONER

## 2020-09-04 PROCEDURE — 82947 ASSAY GLUCOSE BLOOD QUANT: CPT | Performed by: NURSE PRACTITIONER

## 2020-09-04 PROCEDURE — 99395 PREV VISIT EST AGE 18-39: CPT | Mod: 25 | Performed by: NURSE PRACTITIONER

## 2020-09-04 PROCEDURE — 82306 VITAMIN D 25 HYDROXY: CPT | Performed by: NURSE PRACTITIONER

## 2020-09-04 RX ORDER — LEVONORGESTREL/ETHIN.ESTRADIOL 0.1-0.02MG
1 TABLET ORAL DAILY
Qty: 84 TABLET | Refills: 4 | Status: SHIPPED | OUTPATIENT
Start: 2020-09-04 | End: 2020-09-09

## 2020-09-04 RX ORDER — LORATADINE 10 MG/1
10 TABLET ORAL DAILY
COMMUNITY
End: 2022-11-20

## 2020-09-04 ASSESSMENT — ENCOUNTER SYMPTOMS
HEMATOCHEZIA: 0
ABDOMINAL PAIN: 0
ENDOCRINE NEGATIVE: 1
EYES NEGATIVE: 1
MUSCULOSKELETAL NEGATIVE: 1
CONSTIPATION: 0
DIZZINESS: 1
PSYCHIATRIC NEGATIVE: 1
HEMATURIA: 0
CHILLS: 0
COUGH: 0

## 2020-09-04 ASSESSMENT — MIFFLIN-ST. JEOR: SCORE: 1315.45

## 2020-09-04 NOTE — PROGRESS NOTES
SUBJECTIVE:   CC: Brianna Han is an 39 year old woman who presents for preventive health visit.     Healthy Habits:     Getting at least 3 servings of Calcium per day:  Yes    Bi-annual eye exam:  Yes    Dental care twice a year:  Yes    Sleep apnea or symptoms of sleep apnea:  None    Diet:  Vegetarian/vegan    Frequency of exercise:  4-5 days/week    Duration of exercise:  30-45 minutes    Taking medications regularly:  Yes    Medication side effects:  None    PHQ-2 Total Score: 2    Additional concerns today:  No    Today's PHQ-2 Score:   PHQ-2 ( 1999 [employer]) 9/4/2020   Q1: Little interest or pleasure in doing things 1   Q2: Feeling down, depressed or hopeless 1   PHQ-2 Score 2   Q1: Little interest or pleasure in doing things Several days   Q2: Feeling down, depressed or hopeless Several days   PHQ-2 Score 2       Abuse: Current or Past (Physical, Sexual or Emotional) - No  Do you feel safe in your environment? Yes    Social History     Tobacco Use     Smoking status: Never Smoker     Smokeless tobacco: Never Used   Substance Use Topics     Alcohol use: Yes     Comment: 1-2 x per week      If you drink alcohol do you typically have >3 drinks per day or >7 drinks per week? No    Alcohol Use 9/4/2020   Prescreen: >3 drinks/day or >7 drinks/week? No   Prescreen: >3 drinks/day or >7 drinks/week? -   No flowsheet data found.    Reviewed orders with patient.  Reviewed health maintenance and updated orders accordingly - Yes  Lab work is in process  Labs reviewed in EPIC  BP Readings from Last 3 Encounters:   09/04/20 112/66   08/13/19 102/63   06/06/19 100/64    Wt Readings from Last 3 Encounters:   09/04/20 67.1 kg (148 lb)   01/20/20 65.7 kg (144 lb 12.8 oz)   06/06/19 65.3 kg (144 lb)                  Patient Active Problem List   Diagnosis     CARDIOVASCULAR SCREENING; LDL GOAL LESS THAN 160     IBS (irritable bowel syndrome)     Encounter for other general counseling or advice on contraception      Nonallopathic lesion of sacral region     Nonallopathic lesion of lower extremities     Pain in joint, lower leg     Family circumstance     Right-sided low back pain with right-sided sciatica     Past Surgical History:   Procedure Laterality Date     BREAST SURGERY      Breast reduction 2002     C NONSPECIFIC PROCEDURE  06/2002    breast reduction     GENITOURINARY SURGERY      childhood urethra surgery       Social History     Tobacco Use     Smoking status: Never Smoker     Smokeless tobacco: Never Used   Substance Use Topics     Alcohol use: Yes     Comment: 1-2 x per week      Family History   Problem Relation Age of Onset     Breast Cancer Mother      Depression Mother      Cerebrovascular Disease Mother 66     Back Pain Mother      Cancer Maternal Grandmother      Breast Cancer Maternal Grandmother      Back Pain Maternal Grandmother         sciatica      Alcohol/Drug Brother      Depression Sister      Back Pain Sister      Anxiety Disorder Sister          Current Outpatient Medications   Medication Sig Dispense Refill     levonorgestrel-ethinyl estradiol (LARISSIA) 0.1-20 MG-MCG tablet Take 1 tablet by mouth daily DUE FOR APPOINTMENT June 2020-NO FURTHER REFILLS 84 tablet 4     loratadine (CLARITIN) 10 MG tablet Take 10 mg by mouth daily       naproxen sodium 220 MG capsule Take 220 mg by mouth 2 times daily as needed for pain       tiZANidine (ZANAFLEX) 2 MG capsule Take 1-2 capsules (2-4 mg) by mouth 3 times daily 30 capsule 1     tiZANidine (ZANAFLEX) 2 MG tablet Take 1-2 tablets (2-4 mg) by mouth 3 times daily as needed for muscle spasms (Patient not taking: Reported on 9/4/2020) 30 tablet 1     Allergies   Allergen Reactions     Iodine Hives     Latex      Recent Labs   Lab Test 11/09/16  0904 02/08/16  1607 09/04/12  1854   LDL 73  --  61   HDL 57  --  61   TRIG 57  --  146   ALT  --  17  --    CR  --  0.76  --    GFRESTIMATED  --  87  --    GFRESTBLACK  --  >90   GFR Calc    --     POTASSIUM  --  4.3  --    TSH  --  1.70 2.57      Mammo:  + family history of breast cancer.  Brianna denies any current symptoms.    Pertinent mammograms are reviewed under the imaging tab.  History of abnormal Pap smear:   Last 3 Pap and HPV Results:   PAP / HPV Latest Ref Rng & Units 5/22/2018 4/27/2015 9/4/2012   PAP - NIL NIL NIL   HPV 16 DNA NEG:Negative Negative - -   HPV 18 DNA NEG:Negative Negative - -   OTHER HR HPV NEG:Negative Negative - -     PAP / HPV Latest Ref Rng & Units 5/22/2018 4/27/2015 9/4/2012   PAP - NIL NIL NIL   HPV 16 DNA NEG:Negative Negative - -   HPV 18 DNA NEG:Negative Negative - -   OTHER HR HPV NEG:Negative Negative - -     Reviewed and updated as needed this visit by clinical staff  Tobacco  Allergies  Meds  Problems  Med Hx  Surg Hx  Fam Hx  Soc Hx          Reviewed and updated as needed this visit by Provider  Tobacco  Allergies  Meds  Problems  Med Hx  Surg Hx  Fam Hx              Running intolerance:  Brianna has a history of feeling dizzy and lightheaded when she runs/exercises.  Seen by cardiology in 2017, exercise stress test, and that work up was normal.  Currently running 3 miles at a time.  She will run, get dizzy, sick to her stomach. She will have to stop and rest for 5 minutes ad then she can finish her work out.  If she does not stop to rest, she can't finish the run.  She is trying to stay hydrated.  Trying to eat an hour before.  Runner for years.  She is not sure what to do next.    Vegetarian.  Getting proteins/balanced diet.    Review of Systems   Constitutional: Negative for chills.   HENT: Negative for congestion.    Eyes: Negative.    Respiratory: Negative for cough.    Cardiovascular: Negative for chest pain.   Gastrointestinal: Negative for abdominal pain, constipation and hematochezia.   Endocrine: Negative.    Breasts:  negative.    Genitourinary: Negative.  Negative for hematuria.   Musculoskeletal: Negative.    Neurological: Positive for  "dizziness.   Psychiatric/Behavioral: Negative.         OBJECTIVE:   /66 (BP Location: Right arm, Patient Position: Sitting, Cuff Size: Adult Regular)   Pulse 51   Temp 98  F (36.7  C) (Temporal)   Resp 16   Ht 1.6 m (5' 3\")   Wt 67.1 kg (148 lb)   LMP 08/26/2020 (Within Days)   SpO2 98%   BMI 26.22 kg/m    Physical Exam  GENERAL: healthy, alert and no distress  EYES: Eyes grossly normal to inspection, PERRL and conjunctivae and sclerae normal  HENT: ear canals and TM's normal, nose and mouth without ulcers or lesions  NECK: no adenopathy, no asymmetry, masses, or scars and thyroid normal to palpation  RESP: lungs clear to auscultation - no rales, rhonchi or wheezes  BREAST: normal without masses, tenderness or nipple discharge and no palpable axillary masses or adenopathy  CV: regular rate and rhythm, normal S1 S2, no S3 or S4, no murmur, click or rub, no peripheral edema and peripheral pulses strong  ABDOMEN: soft, nontender, no hepatosplenomegaly, no masses and bowel sounds normal  MS: no gross musculoskeletal defects noted, no edema  SKIN: no suspicious lesions or rashes  NEURO: Normal strength and tone, mentation intact and speech normal  PSYCH: mentation appears normal, affect normal/bright    Diagnostic Test Results:  Labs reviewed in [facility]  Labs drawn, results    ASSESSMENT/PLAN:   (Z00.00) Routine general medical examination at a health care facility  (primary encounter diagnosis)  Comment:   Plan: Lipid panel reflex to direct LDL Fasting,         Hemoglobin, Glucose, Vitamin D Deficiency, TSH         with free T4 reflex            (Z30.41) Encounter for surveillance of contraceptive pills  Comment:   Plan: levonorgestrel-ethinyl estradiol (LARISSIA)         0.1-20 MG-MCG tablet        Refills given.  Yearly clinic appts for refills,sooner if problems.     (R42) Dizziness with running  Comment: uncertain  Plan: I reassured the patient today that her neuro and cardiology work ups were " "negative/normal.  I talked about rest with activity, nutrition, carbohydrates, etc.  See message sent to patient today.      (Z23) Need for prophylactic vaccination and inoculation against influenza  Comment: routine  Plan: INFLUENZA VACCINE IM > 6 MONTHS VALENT IIV4         [85963], ADMIN 1st VACCINE        Given today    (Z13.1) Screening for diabetes mellitus  Comment: routine  Plan: Glucose        Done today    (Z13.0) Screening for iron deficiency anemia  Comment:   Plan: Hemoglobin        Done today    (Z13.220) Screening for hyperlipidemia  Comment:   Plan: Lipid panel reflex to direct LDL Fasting        Done today    (Z13.21) Encounter for vitamin deficiency screening  Comment:   Plan: Vitamin D Deficiency        Done today    (Z13.29) Screening for thyroid disorder  Comment:   Plan: TSH with free T4 reflex        Done today      COUNSELING:  Reviewed preventive health counseling, as reflected in patient instructions    Estimated body mass index is 26.22 kg/m  as calculated from the following:    Height as of this encounter: 1.6 m (5' 3\").    Weight as of this encounter: 67.1 kg (148 lb).    Weight management plan: Discussed healthy diet and exercise guidelines    She reports that she has never smoked. She has never used smokeless tobacco.      Counseling Resources:  ATP IV Guidelines  Pooled Cohorts Equation Calculator  Breast Cancer Risk Calculator  BRCA-Related Cancer Risk Assessment: FHS-7 Tool  FRAX Risk Assessment  ICSI Preventive Guidelines  Dietary Guidelines for Americans, 2010  USDA's MyPlate  ASA Prophylaxis  Lung CA Screening    LAYNE Larson [facility]  "

## 2020-09-08 LAB — DEPRECATED CALCIDIOL+CALCIFEROL SERPL-MC: 36 UG/L (ref 20–75)

## 2020-11-09 ENCOUNTER — MYC REFILL (OUTPATIENT)
Dept: FAMILY MEDICINE | Facility: CLINIC | Age: 40
End: 2020-11-09

## 2020-11-09 DIAGNOSIS — M54.50 ACUTE RIGHT-SIDED LOW BACK PAIN WITHOUT SCIATICA: ICD-10-CM

## 2020-11-12 RX ORDER — TIZANIDINE HYDROCHLORIDE 2 MG/1
2-4 CAPSULE, GELATIN COATED ORAL 3 TIMES DAILY PRN
Qty: 30 CAPSULE | Refills: 1 | Status: SHIPPED | OUTPATIENT
Start: 2020-11-12 | End: 2021-11-17

## 2020-11-16 ENCOUNTER — OFFICE VISIT (OUTPATIENT)
Dept: ORTHOPEDICS | Facility: CLINIC | Age: 40
End: 2020-11-16
Payer: COMMERCIAL

## 2020-11-16 VITALS
HEIGHT: 63 IN | WEIGHT: 150.4 LBS | SYSTOLIC BLOOD PRESSURE: 108 MMHG | BODY MASS INDEX: 26.65 KG/M2 | DIASTOLIC BLOOD PRESSURE: 68 MMHG

## 2020-11-16 DIAGNOSIS — M25.569 ARTHRALGIA OF LOWER LEG, UNSPECIFIED LATERALITY: ICD-10-CM

## 2020-11-16 DIAGNOSIS — G89.29 CHRONIC LEFT-SIDED LOW BACK PAIN WITHOUT SCIATICA: Primary | ICD-10-CM

## 2020-11-16 DIAGNOSIS — M54.50 CHRONIC LEFT-SIDED LOW BACK PAIN WITHOUT SCIATICA: Primary | ICD-10-CM

## 2020-11-16 PROCEDURE — 99203 OFFICE O/P NEW LOW 30 MIN: CPT | Performed by: ORTHOPAEDIC SURGERY

## 2020-11-16 ASSESSMENT — MIFFLIN-ST. JEOR: SCORE: 1321.34

## 2020-11-16 NOTE — PATIENT INSTRUCTIONS
Stay active as possible  Over-the-counter medications  Core strengthening  Avoid twisting and repetitive bending activities

## 2020-11-16 NOTE — PROGRESS NOTES
HISTORY OF PRESENT ILLNESS:    Brianna Han is a 40 year old female who is seen in consultation at the request of Dr. Andrade for low back pain.    Present symptoms: Patient reports pain in low back which began on 11/8/20. She notes that pain began while working out and doing burpees. Patient notes pain in left sided low back with pain occasionally radiating down into left buttock. Patient reports pain at worst is 3/10 and currently pain is 0-1/10. She notes achy pain in the morning. She also notes muscle spasms in right sided low back. Pain has improved with physical therapy home exercises, rest, tizanidine and activity avoidance. Pain increases with walking and sitting (both have improved), hip flexion, putting on socks (crossing legs) and bending. Patient notes that she is concerned about her ability to stay active.    At one point, she had a right foot drop.  It got resolved in a matter of 6 weeks or so with physical therapy.  She denies any bowel bladder dysfunction symptoms.  Treatments tried to this point: tizanidine, rest, home PT exercises, xrays lumbar spine  Orthopedic PMH: patient reports h/o acute on chronic low back pain - has had instances of acute low back pain 3 previous times (has completed physical therapy, xrays, home exercises, oral steroids, chiropractic care, injection - unsure if it was KYLE)    Past Medical History:   Diagnosis Date     Depressive disorder      Uncomplicated asthma     childhood       Past Surgical History:   Procedure Laterality Date     BREAST SURGERY      Breast reduction 2002     GENITOURINARY SURGERY      childhood urethra surgery     ZZC NONSPECIFIC PROCEDURE  06/2002    breast reduction       Family History   Problem Relation Age of Onset     Breast Cancer Mother      Depression Mother      Cerebrovascular Disease Mother 66     Back Pain Mother      Cancer Maternal Grandmother      Breast Cancer Maternal Grandmother      Back Pain Maternal Grandmother          sciatica      Alcohol/Drug Brother      Depression Sister      Back Pain Sister      Anxiety Disorder Sister        Social History     Socioeconomic History     Marital status: Single     Spouse name: Not on file     Number of children: 0     Years of education: Not on file     Highest education level: Not on file   Occupational History     Occupation:      Employer: [employer]     Comment:    Social Needs     Financial resource strain: Not on file     Food insecurity     Worry: Not on file     Inability: Not on file     Transportation needs     Medical: Not on file     Non-medical: Not on file   Tobacco Use     Smoking status: Never Smoker     Smokeless tobacco: Never Used   Substance and Sexual Activity     Alcohol use: Yes     Comment: 1-2 x per week      Drug use: No     Sexual activity: Yes     Partners: Male     Birth control/protection: Pill, Condom   Lifestyle     Physical activity     Days per week: Not on file     Minutes per session: Not on file     Stress: Not on file   Relationships     Social connections     Talks on phone: Not on file     Gets together: Not on file     Attends Anabaptist service: Not on file     Active member of club or organization: Not on file     Attends meetings of clubs or organizations: Not on file     Relationship status: Not on file     Intimate partner violence     Fear of current or ex partner: Not on file     Emotionally abused: Not on file     Physically abused: Not on file     Forced sexual activity: Not on file   Other Topics Concern     Parent/sibling w/ CABG, MI or angioplasty before 65F 55M? No      Service Not Asked     Blood Transfusions Not Asked     Caffeine Concern Yes     Comment: thermos of coffee a day      Occupational Exposure Not Asked     Hobby Hazards Not Asked     Sleep Concern Not Asked     Stress Concern Not Asked     Weight Concern Not Asked     Special Diet No     Comment: vegitarian     Back Care Not Asked     Exercise  No     Comment: 3-4 times a week      Bike Helmet Not Asked     Seat Belt Not Asked     Self-Exams Not Asked   Social History Narrative    Dairy/d 3 servings/d.     Caffeine 1 servings/d    Exercise 4 x week    Sunscreen used - Yes    Seatbelts used - Yes    Working smoke/CO detectors in the home - Yes    Guns stored in the home - NO    Self Breast Exams - Yes    Eye Exam up to date - 2009    Dental Exam up to date - Yes    Pap Smear up to date - Yes    Mammogram up to date - NA    Dexa Scan up to date - NA    Flex Sig / Colonoscopy up to date - NA    Immunizations up to date - 2010    Abuse: Current or Past(Physical, Sexual or Emotional)- No    Do you feel safe in your environment - Yes        Updated: 2010               Current Outpatient Medications   Medication Sig Dispense Refill     LARISSIA 0.1-20 MG-MCG tablet TAKE 1 TABLET BY MOUTH  DAILY 84 tablet 1     loratadine (CLARITIN) 10 MG tablet Take 10 mg by mouth daily       naproxen sodium 220 MG capsule Take 220 mg by mouth 2 times daily as needed for pain       tiZANidine (ZANAFLEX) 2 MG capsule Take 1-2 capsules (2-4 mg) by mouth 3 times daily as needed (muscle spasms) 30 capsule 1     tiZANidine (ZANAFLEX) 2 MG tablet Take 1-2 tablets (2-4 mg) by mouth 3 times daily as needed for muscle spasms (Patient not taking: Reported on 9/4/2020) 30 tablet 1       Allergies   Allergen Reactions     Iodine Hives     Latex        REVIEW OF SYSTEMS:  CONSTITUTIONAL:  NEGATIVE for fever, chills, change in weight  INTEGUMENTARY/SKIN:  NEGATIVE for worrisome rashes, moles or lesions  EYES:  NEGATIVE for vision changes or irritation  ENT/MOUTH:  NEGATIVE for ear, mouth and throat problems  RESP:  NEGATIVE for significant cough or SOB  BREAST:  NEGATIVE for masses, tenderness or discharge  CV:  NEGATIVE for chest pain, palpitations or peripheral edema  GI:  NEGATIVE for nausea, abdominal pain, heartburn, or change in bowel habits  :  Negative   MUSCULOSKELETAL:  See HPI  above  NEURO:  NEGATIVE for weakness, dizziness or paresthesias  ENDOCRINE:  NEGATIVE for temperature intolerance, skin/hair changes  HEME/ALLERGY/IMMUNE:  NEGATIVE for bleeding problems  PSYCHIATRIC:  NEGATIVE for changes in mood or affect      PHYSICAL EXAM:  There were no vitals taken for this visit.  There is no height or weight on file to calculate BMI.   GENERAL APPEARANCE: healthy, alert and no distress   HEENT: No apparent thyroid megaly. Clear sclera with normal ocular movement  RESPIRATORY: No labored breathing  SKIN: no suspicious lesions or rashes  NEURO: Normal strength and tone, mentation intact and speech normal  VASCULAR: Good pulses, and capillary refill   LYMPH: no lymphadenopathy   PSYCH:  mentation appears normal and affect normal/bright    MUSCULOSKELETAL:  Not in acute distress  Able to get up from sitting quite readily  Normal gait without limping  Able to walk on her toes and heels  Full range of motion of the lumbar spine  Localized tenderness at the upper portion of the left SI joint and medial posterior iliac crest  Straight leg raising is negative  Femoral stretch test is negative  Gross sensation is intact  Motor strength is full  She is able to walk on her toes and heels    Hip and knee range of motion is full     ASSESSMENT:  Chronic intermittent lumbar DJD and radiculopathy  Currently minimally symptomatic    PLAN:    Her symptoms and physical examination findings are very benign at this point.  No further intervention was felt to be necessary.  To maintain her level of overall function, I recommend core strengthening exercises.  We talked about the indication for obtaining MRI scan.  With absence of any ongoing pain or neurologic deficit, no MRI scan is felt to be necessary.  However, if she develops foot drop in the future for instance, she should consider MRI scan evaluation.  Over-the-counter medications and muscle relaxant along with the stretching exercises were  discussed.  Follow-up with me on as-needed basis.      Imaging Interpretation:     Result Narrative   EXAM: XR LUMBAR SPINE 3 VIEWS  LOCATION: [facility]  DATE/TIME: 1/18/2019 9:52 AM    INDICATION: Left-sided lower back pain with radiation down left leg  COMPARISON: None.    FINDINGS: 5 lumbar type vertebra. Mild lumbar dextrocurvature and straightened lumbar lordosis. No fracture or subluxation. Mild lumbar spondylosis with mild loss of disc height at L2-3, L4-5, and L5-S1. Minor associated endplate spurring.         Jos Cornelius MD  Department of Orthopedic Surgery        Disclaimer: This note consists of symbols derived from keyboarding, dictation and/or voice recognition software. As a result, there may be errors in the script that have gone undetected. Please consider this when interpreting information found in this chart.

## 2020-11-16 NOTE — LETTER
11/16/2020         RE: Brianna Han  [address]  [place] [address]        Dear Colleague,    Thank you for referring your patient, Brianna Han, to the [facility] [place]. Please see a copy of my visit note below.    HISTORY OF PRESENT ILLNESS:    Brianna Han is a 40 year old female who is seen in consultation at the request of Dr. Andrade for low back pain.    Present symptoms: Patient reports pain in low back which began on 11/8/20. She notes that pain began while working out and doing burpees. Patient notes pain in left sided low back with pain occasionally radiating down into left buttock. Patient reports pain at worst is 3/10 and currently pain is 0-1/10. She notes achy pain in the morning. She also notes muscle spasms in right sided low back. Pain has improved with physical therapy home exercises, rest, tizanidine and activity avoidance. Pain increases with walking and sitting (both have improved), hip flexion, putting on socks (crossing legs) and bending. Patient notes that she is concerned about her ability to stay active.    At one point, she had a right foot drop.  It got resolved in a matter of 6 weeks or so with physical therapy.  She denies any bowel bladder dysfunction symptoms.  Treatments tried to this point: tizanidine, rest, home PT exercises, xrays lumbar spine  Orthopedic PMH: patient reports h/o acute on chronic low back pain - has had instances of acute low back pain 3 previous times (has completed physical therapy, xrays, home exercises, oral steroids, chiropractic care, injection - unsure if it was KYLE)    Past Medical History:   Diagnosis Date     Depressive disorder      Uncomplicated asthma     childhood       Past Surgical History:   Procedure Laterality Date     BREAST SURGERY      Breast reduction 2002     GENITOURINARY SURGERY      childhood urethra surgery     ZZC NONSPECIFIC PROCEDURE  06/2002    breast reduction        Family History   Problem Relation Age of Onset     Breast Cancer Mother      Depression Mother      Cerebrovascular Disease Mother 66     Back Pain Mother      Cancer Maternal Grandmother      Breast Cancer Maternal Grandmother      Back Pain Maternal Grandmother         sciatica      Alcohol/Drug Brother      Depression Sister      Back Pain Sister      Anxiety Disorder Sister        Social History     Socioeconomic History     Marital status: Single     Spouse name: Not on file     Number of children: 0     Years of education: Not on file     Highest education level: Not on file   Occupational History     Occupation:      Employer: [employer]     Comment:    Social Needs     Financial resource strain: Not on file     Food insecurity     Worry: Not on file     Inability: Not on file     Transportation needs     Medical: Not on file     Non-medical: Not on file   Tobacco Use     Smoking status: Never Smoker     Smokeless tobacco: Never Used   Substance and Sexual Activity     Alcohol use: Yes     Comment: 1-2 x per week      Drug use: No     Sexual activity: Yes     Partners: Male     Birth control/protection: Pill, Condom   Lifestyle     Physical activity     Days per week: Not on file     Minutes per session: Not on file     Stress: Not on file   Relationships     Social connections     Talks on phone: Not on file     Gets together: Not on file     Attends Hinduism service: Not on file     Active member of club or organization: Not on file     Attends meetings of clubs or organizations: Not on file     Relationship status: Not on file     Intimate partner violence     Fear of current or ex partner: Not on file     Emotionally abused: Not on file     Physically abused: Not on file     Forced sexual activity: Not on file   Other Topics Concern     Parent/sibling w/ CABG, MI or angioplasty before 65F 55M? No      Service Not Asked     Blood Transfusions Not Asked     Caffeine  Concern Yes     Comment: thermos of coffee a day      Occupational Exposure Not Asked     Hobby Hazards Not Asked     Sleep Concern Not Asked     Stress Concern Not Asked     Weight Concern Not Asked     Special Diet No     Comment: vegitarian     Back Care Not Asked     Exercise No     Comment: 3-4 times a week      Bike Helmet Not Asked     Seat Belt Not Asked     Self-Exams Not Asked   Social History Narrative    Dairy/d 3 servings/d.     Caffeine 1 servings/d    Exercise 4 x week    Sunscreen used - Yes    Seatbelts used - Yes    Working smoke/CO detectors in the home - Yes    Guns stored in the home - NO    Self Breast Exams - Yes    Eye Exam up to date - 2009    Dental Exam up to date - Yes    Pap Smear up to date - Yes    Mammogram up to date - NA    Dexa Scan up to date - NA    Flex Sig / Colonoscopy up to date - NA    Immunizations up to date - 2010    Abuse: Current or Past(Physical, Sexual or Emotional)- No    Do you feel safe in your environment - Yes        Updated: 2010               Current Outpatient Medications   Medication Sig Dispense Refill     LARISSIA 0.1-20 MG-MCG tablet TAKE 1 TABLET BY MOUTH  DAILY 84 tablet 1     loratadine (CLARITIN) 10 MG tablet Take 10 mg by mouth daily       naproxen sodium 220 MG capsule Take 220 mg by mouth 2 times daily as needed for pain       tiZANidine (ZANAFLEX) 2 MG capsule Take 1-2 capsules (2-4 mg) by mouth 3 times daily as needed (muscle spasms) 30 capsule 1     tiZANidine (ZANAFLEX) 2 MG tablet Take 1-2 tablets (2-4 mg) by mouth 3 times daily as needed for muscle spasms (Patient not taking: Reported on 9/4/2020) 30 tablet 1       Allergies   Allergen Reactions     Iodine Hives     Latex        REVIEW OF SYSTEMS:  CONSTITUTIONAL:  NEGATIVE for fever, chills, change in weight  INTEGUMENTARY/SKIN:  NEGATIVE for worrisome rashes, moles or lesions  EYES:  NEGATIVE for vision changes or irritation  ENT/MOUTH:  NEGATIVE for ear, mouth and throat problems  RESP:   NEGATIVE for significant cough or SOB  BREAST:  NEGATIVE for masses, tenderness or discharge  CV:  NEGATIVE for chest pain, palpitations or peripheral edema  GI:  NEGATIVE for nausea, abdominal pain, heartburn, or change in bowel habits  :  Negative   MUSCULOSKELETAL:  See HPI above  NEURO:  NEGATIVE for weakness, dizziness or paresthesias  ENDOCRINE:  NEGATIVE for temperature intolerance, skin/hair changes  HEME/ALLERGY/IMMUNE:  NEGATIVE for bleeding problems  PSYCHIATRIC:  NEGATIVE for changes in mood or affect      PHYSICAL EXAM:  There were no vitals taken for this visit.  There is no height or weight on file to calculate BMI.   GENERAL APPEARANCE: healthy, alert and no distress   HEENT: No apparent thyroid megaly. Clear sclera with normal ocular movement  RESPIRATORY: No labored breathing  SKIN: no suspicious lesions or rashes  NEURO: Normal strength and tone, mentation intact and speech normal  VASCULAR: Good pulses, and capillary refill   LYMPH: no lymphadenopathy   PSYCH:  mentation appears normal and affect normal/bright    MUSCULOSKELETAL:  Not in acute distress  Able to get up from sitting quite readily  Normal gait without limping  Able to walk on her toes and heels  Full range of motion of the lumbar spine  Localized tenderness at the upper portion of the left SI joint and medial posterior iliac crest  Straight leg raising is negative  Femoral stretch test is negative  Gross sensation is intact  Motor strength is full  She is able to walk on her toes and heels    Hip and knee range of motion is full     ASSESSMENT:  Chronic intermittent lumbar DJD and radiculopathy  Currently minimally symptomatic    PLAN:    Her symptoms and physical examination findings are very benign at this point.  No further intervention was felt to be necessary.  To maintain her level of overall function, I recommend core strengthening exercises.  We talked about the indication for obtaining MRI scan.  With absence of any  ongoing pain or neurologic deficit, no MRI scan is felt to be necessary.  However, if she develops foot drop in the future for instance, she should consider MRI scan evaluation.  Over-the-counter medications and muscle relaxant along with the stretching exercises were discussed.  Follow-up with me on as-needed basis.      Imaging Interpretation:     Result Narrative   EXAM: XR LUMBAR SPINE 3 VIEWS  LOCATION: [facility]  DATE/TIME: 1/18/2019 9:52 AM    INDICATION: Left-sided lower back pain with radiation down left leg  COMPARISON: None.    FINDINGS: 5 lumbar type vertebra. Mild lumbar dextrocurvature and straightened lumbar lordosis. No fracture or subluxation. Mild lumbar spondylosis with mild loss of disc height at L2-3, L4-5, and L5-S1. Minor associated endplate spurring.         Jos Cornelius MD  Department of Orthopedic Surgery        Disclaimer: This note consists of symbols derived from keyboarding, dictation and/or voice recognition software. As a result, there may be errors in the script that have gone undetected. Please consider this when interpreting information found in this chart.        Again, thank you for allowing me to participate in the care of your patient.        Sincerely,        Jos Cornelius MD

## 2021-02-03 DIAGNOSIS — M54.42 LEFT-SIDED LOW BACK PAIN WITH LEFT-SIDED SCIATICA, UNSPECIFIED CHRONICITY: ICD-10-CM

## 2021-02-03 RX ORDER — TIZANIDINE 2 MG/1
2-4 TABLET ORAL 3 TIMES DAILY PRN
Qty: 30 TABLET | Refills: 1 | Status: SHIPPED | OUTPATIENT
Start: 2021-02-03 | End: 2022-05-16

## 2021-02-10 PROBLEM — M54.41 RIGHT-SIDED LOW BACK PAIN WITH RIGHT-SIDED SCIATICA: Status: RESOLVED | Noted: 2020-01-29 | Resolved: 2021-02-10

## 2021-06-10 ENCOUNTER — MYC MEDICAL ADVICE (OUTPATIENT)
Dept: FAMILY MEDICINE | Facility: CLINIC | Age: 41
End: 2021-06-10

## 2021-06-10 DIAGNOSIS — M54.42 LEFT-SIDED LOW BACK PAIN WITH LEFT-SIDED SCIATICA, UNSPECIFIED CHRONICITY: Primary | ICD-10-CM

## 2021-06-10 DIAGNOSIS — R20.0 NUMBNESS OF LEFT FOOT: ICD-10-CM

## 2021-06-11 ENCOUNTER — THERAPY VISIT (OUTPATIENT)
Dept: PHYSICAL THERAPY | Facility: CLINIC | Age: 41
End: 2021-06-11
Payer: COMMERCIAL

## 2021-06-11 DIAGNOSIS — M54.41 RIGHT-SIDED LOW BACK PAIN WITH RIGHT-SIDED SCIATICA: ICD-10-CM

## 2021-06-11 DIAGNOSIS — R20.0 NUMBNESS OF LEFT FOOT: ICD-10-CM

## 2021-06-11 DIAGNOSIS — M54.42 LEFT-SIDED LOW BACK PAIN WITH LEFT-SIDED SCIATICA, UNSPECIFIED CHRONICITY: ICD-10-CM

## 2021-06-11 PROCEDURE — 97161 PT EVAL LOW COMPLEX 20 MIN: CPT | Mod: GP | Performed by: PHYSICAL THERAPIST

## 2021-06-11 PROCEDURE — 97110 THERAPEUTIC EXERCISES: CPT | Mod: GP | Performed by: PHYSICAL THERAPIST

## 2021-06-11 NOTE — TELEPHONE ENCOUNTER
Pended EMILIANA referral as requested.  Please review, edit and sign if you agree.  Jyoti Alarcon RN  [facility]

## 2021-06-15 NOTE — PROGRESS NOTES
Physical Therapy Initial Evaluation  Subjective:    Therapist Generated HPI Evaluation  Problem details: Pt presents to PT with a chief complaint of R sided LBP with mild tingling into her R foot with reported onset ~1 month ago 05/2021. Pt has a previous history of R sided Lumbar Radiculopathy with ankle dorsiflexion weakness which has been treated successfully with PT. Pt reports her current episode is not as severe as her previous exacerbations but patient hoping to prevent worsening of her current sxs. Primary pain location identified at R lower lumbar spine/gluteals with mild tingling at R great toe. Pain worse with hiking, bending forward and lifting. .         Type of problem:  Lumbar.    This is a recurrent condition.  Occurance: hiking up hills.  Where condition occurred: in the community.  Patient reports pain:  Lower lumbar spine and lumbar spine right.  Pain is described as aching and is intermittent.  Pain radiates to:  Foot right. Pain is worse in the P.M..  Since onset symptoms are gradually improving.  Associated symptoms:  Loss of motion/stiffness and numbness. Symptoms are exacerbated by bending, lifting and walking  and relieved by activity/movement and NSAID's.    Previous treatment includes physical therapy. There was significant improvement following previous treatment.  Restrictions due to condition include:  Working in normal job without restrictions.  Barriers include:  None as reported by patient.    Patient Health History         Pain is reported as 3/10 on pain scale.  General health as reported by patient is good.  Pertinent medical history includes: none.   Red flags:  None as reported by patient.  Medical allergies: none.   Surgeries include:  None.    Current medications:  None.       Primary job tasks include:  Computer work.                                    Objective:  System         Lumbar/SI Evaluation  ROM:    AROM Lumbar:   Flexion:          Min loss, pain + R gluteals  Ext:                     Min loss   Side Bend:        Left:     Right:   Rotation:           Left:  Min loss    Right:  Min loss  Side Glide:        Left:     Right:           Lumbar Myotomes:  normal            Lumbar DTR's:  normal      Cord Signs:  normal    Lumbar Dermtomes:            L4 Left:  Normal-light touch       L4 Right:  Normal-light touch  L5 Left:  Normal-light touch     L5 Right:  Hypo-light touch  S1 Left:  Normal-light touch     S1 Right:  Normal-light touch    Lumbar Palpation:      Tenderness present at Right: Piriformis      Spinal Segmental Conclusions:     Level: Hypo noted at L5                                        Hip Evaluation    Hip Strength:      Extension:  Left: 5-/5  Pain:Right: 4+/5    Pain:    Abduction:  Left: 5-/5     Pain:Right: 4+/5    Pain:                                 General     ROS    Assessment/Plan:    Patient is a 40 year old female with lumbar complaints.    Patient has the following significant findings with corresponding treatment plan.                Diagnosis 1:  R sided LBP w/ R sciatica  Pain -  manual therapy, splint/taping/bracing/orthotics and self management  Decreased ROM/flexibility - manual therapy and therapeutic exercise  Decreased strength - therapeutic exercise and therapeutic activities  Impaired muscle performance - neuro re-education    Therapy Evaluation Codes:   Cumulative Therapy Evaluation is: Low complexity.    Previous and current functional limitations:  (See Goal Flow Sheet for this information)    Short term and Long term goals: (See Goal Flow Sheet for this information)     Communication ability:  Patient appears to be able to clearly communicate and understand verbal and written communication and follow directions correctly.  Treatment Explanation - The following has been discussed with the patient:   RX ordered/plan of care  Anticipated outcomes  Possible risks and side effects  This patient would benefit from PT intervention to resume  normal activities.   Rehab potential is good.    Frequency:  1 X week, once daily  Duration:  for 8 weeks  Discharge Plan:  Achieve all LTG.  Independent in home treatment program.  Reach maximal therapeutic benefit.    Please refer to the daily flowsheet for treatment today, total treatment time and time spent performing 1:1 timed codes.

## 2021-06-24 ENCOUNTER — E-VISIT (OUTPATIENT)
Dept: FAMILY MEDICINE | Facility: CLINIC | Age: 41
End: 2021-06-24
Payer: COMMERCIAL

## 2021-06-24 DIAGNOSIS — N39.0 ACUTE UTI (URINARY TRACT INFECTION): ICD-10-CM

## 2021-06-24 DIAGNOSIS — N30.00 ACUTE CYSTITIS WITHOUT HEMATURIA: Primary | ICD-10-CM

## 2021-06-24 PROCEDURE — 99421 OL DIG E/M SVC 5-10 MIN: CPT | Performed by: NURSE PRACTITIONER

## 2021-06-25 RX ORDER — SULFAMETHOXAZOLE/TRIMETHOPRIM 800-160 MG
1 TABLET ORAL 2 TIMES DAILY
Qty: 6 TABLET | Refills: 0 | Status: SHIPPED | OUTPATIENT
Start: 2021-06-25 | End: 2021-11-17

## 2021-06-25 NOTE — PATIENT INSTRUCTIONS
Dear Brianna Han    After reviewing your responses, I've been able to diagnose you with a urinary tract infection, which is a common infection of the bladder with bacteria.  This is not a sexually transmitted infection, though urinating immediately after intercourse can help prevent infections.  Drinking lots of fluids is also helpful to clear your current infection and prevent the next one.      I have sent a prescription for antibiotics to your pharmacy to treat this infection.    It is important that you take all of your prescribed medication even if your symptoms are improving after a few doses.  Taking all of your medicine helps prevent the symptoms from returning.     If your symptoms worsen, you develop pain in your back or stomach, develop fevers, or are not improving in 5 days, please contact your primary care provider for an appointment or visit any of our convenient Walk-in or Urgent Care Centers to be seen, which can be found on our website here.    Thanks again for choosing us as your health care partner,    LAYNE Figueroa CNP

## 2021-09-22 DIAGNOSIS — Z30.41 ENCOUNTER FOR SURVEILLANCE OF CONTRACEPTIVE PILLS: ICD-10-CM

## 2021-09-23 RX ORDER — LEVONORGESTREL AND ETHINYL ESTRADIOL 0.1-0.02MG
KIT ORAL
Qty: 84 TABLET | Refills: 0 | Status: SHIPPED | OUTPATIENT
Start: 2021-09-23 | End: 2021-11-17

## 2021-09-23 NOTE — TELEPHONE ENCOUNTER
1 refill approved needs yearly visit and needs new provider, please call and schedule.    thank you

## 2021-09-25 ENCOUNTER — HEALTH MAINTENANCE LETTER (OUTPATIENT)
Age: 41
End: 2021-09-25

## 2021-09-29 ENCOUNTER — TRANSFERRED RECORDS (OUTPATIENT)
Dept: HEALTH INFORMATION MANAGEMENT | Facility: CLINIC | Age: 41
End: 2021-09-29
Payer: COMMERCIAL

## 2021-11-16 ASSESSMENT — ENCOUNTER SYMPTOMS
HEARTBURN: 0
HEMATURIA: 0
FEVER: 0
NERVOUS/ANXIOUS: 0
HEADACHES: 0
ARTHRALGIAS: 0
DIZZINESS: 0
MYALGIAS: 0
PARESTHESIAS: 0
SHORTNESS OF BREATH: 0
HEMATOCHEZIA: 0
CHILLS: 0
CONSTIPATION: 0
WEAKNESS: 0
NAUSEA: 0
SORE THROAT: 0
PALPITATIONS: 0
EYE PAIN: 0
ABDOMINAL PAIN: 0
DYSURIA: 0
COUGH: 0
DIARRHEA: 0
FREQUENCY: 0
BREAST MASS: 0
JOINT SWELLING: 0

## 2021-11-17 ENCOUNTER — OFFICE VISIT (OUTPATIENT)
Dept: FAMILY MEDICINE | Facility: CLINIC | Age: 41
End: 2021-11-17
Payer: COMMERCIAL

## 2021-11-17 VITALS
TEMPERATURE: 98.1 F | DIASTOLIC BLOOD PRESSURE: 72 MMHG | BODY MASS INDEX: 27.46 KG/M2 | SYSTOLIC BLOOD PRESSURE: 106 MMHG | WEIGHT: 155 LBS | OXYGEN SATURATION: 98 % | HEIGHT: 63 IN | HEART RATE: 51 BPM | RESPIRATION RATE: 16 BRPM

## 2021-11-17 DIAGNOSIS — Z00.00 ROUTINE GENERAL MEDICAL EXAMINATION AT A HEALTH CARE FACILITY: Primary | ICD-10-CM

## 2021-11-17 DIAGNOSIS — Z23 HIGH PRIORITY FOR 2019-NCOV VACCINE: ICD-10-CM

## 2021-11-17 DIAGNOSIS — Z30.41 ENCOUNTER FOR SURVEILLANCE OF CONTRACEPTIVE PILLS: ICD-10-CM

## 2021-11-17 PROCEDURE — 99396 PREV VISIT EST AGE 40-64: CPT | Mod: 25 | Performed by: NURSE PRACTITIONER

## 2021-11-17 PROCEDURE — 91300 COVID-19,PF,PFIZER (12+ YRS): CPT | Performed by: NURSE PRACTITIONER

## 2021-11-17 PROCEDURE — G0145 SCR C/V CYTO,THINLAYER,RESCR: HCPCS | Performed by: NURSE PRACTITIONER

## 2021-11-17 PROCEDURE — 90471 IMMUNIZATION ADMIN: CPT | Performed by: NURSE PRACTITIONER

## 2021-11-17 PROCEDURE — 87624 HPV HI-RISK TYP POOLED RSLT: CPT | Performed by: NURSE PRACTITIONER

## 2021-11-17 PROCEDURE — 0004A COVID-19,PF,PFIZER (12+ YRS): CPT | Performed by: NURSE PRACTITIONER

## 2021-11-17 PROCEDURE — 90686 IIV4 VACC NO PRSV 0.5 ML IM: CPT | Performed by: NURSE PRACTITIONER

## 2021-11-17 RX ORDER — LEVONORGESTREL/ETHIN.ESTRADIOL 0.1-0.02MG
1 TABLET ORAL DAILY
Qty: 84 TABLET | Refills: 3 | Status: SHIPPED | OUTPATIENT
Start: 2021-11-17 | End: 2022-03-14

## 2021-11-17 ASSESSMENT — ENCOUNTER SYMPTOMS
ABDOMINAL PAIN: 0
SORE THROAT: 0
DIZZINESS: 0
MYALGIAS: 0
NERVOUS/ANXIOUS: 0
SHORTNESS OF BREATH: 0
PALPITATIONS: 0
HEMATURIA: 0
ARTHRALGIAS: 0
PARESTHESIAS: 0
EYE PAIN: 0
COUGH: 0
CHILLS: 0
NAUSEA: 0
DYSURIA: 0
DIARRHEA: 0
CONSTIPATION: 0
WEAKNESS: 0
HEMATOCHEZIA: 0
FREQUENCY: 0
HEARTBURN: 0
BREAST MASS: 0
HEADACHES: 0
FEVER: 0
JOINT SWELLING: 0

## 2021-11-17 ASSESSMENT — MIFFLIN-ST. JEOR: SCORE: 1337.21

## 2021-11-17 NOTE — PROGRESS NOTES
SUBJECTIVE:   CC: Brianna Han is an 41 year old woman who presents for preventive health visit.     {  Healthy Habits:     Getting at least 3 servings of Calcium per day:  Yes    Bi-annual eye exam:  Yes    Dental care twice a year:  Yes    Sleep apnea or symptoms of sleep apnea:  None    Diet:  Vegetarian/vegan    Frequency of exercise:  4-5 days/week    Duration of exercise:  30-45 minutes    Taking medications regularly:  Yes    Medication side effects:  None    PHQ-2 Total Score: 0    Additional concerns today:  Yes              Today's PHQ-2 Score:   PHQ-2 ( 1999 Pfizer) 11/16/2021   Q1: Little interest or pleasure in doing things 0   Q2: Feeling down, depressed or hopeless 0   PHQ-2 Score 0   Q1: Little interest or pleasure in doing things Not at all   Q2: Feeling down, depressed or hopeless Not at all   PHQ-2 Score 0       Abuse: Current or Past (Physical, Sexual or Emotional) - No  Do you feel safe in your environment? Yes    Have you ever done Advance Care Planning? (For example, a Health Directive, POLST, or a discussion with a medical provider or your loved ones about your wishes): No, advance care planning information given to patient to review.  Patient plans to discuss their wishes with loved ones or provider.      Social History     Tobacco Use     Smoking status: Never Smoker     Smokeless tobacco: Never Used   Substance Use Topics     Alcohol use: Yes     Comment: 1-2 x per week      If you drink alcohol do you typically have >3 drinks per day or >7 drinks per week? No    Alcohol Use 11/17/2021   Prescreen: >3 drinks/day or >7 drinks/week? -   Prescreen: >3 drinks/day or >7 drinks/week? No   No flowsheet data found.    Reviewed orders with patient.  Reviewed health maintenance and updated orders accordingly - Yes      Breast Cancer Screening:    FHS-7:   Breast CA Risk Assessment (FHS-7) 11/16/2021   Did any of your first-degree relatives have breast or ovarian cancer? Yes   Did any of your  relatives have bilateral breast cancer? No   Did any man in your family have breast cancer? No   Did any woman in your family have breast and ovarian cancer? No   Did any woman in your family have breast cancer before age 50 y? Yes   Do you have 2 or more relatives with breast and/or ovarian cancer? Yes   Do you have 2 or more relatives with breast and/or bowel cancer? No         Pertinent mammograms are reviewed under the imaging tab.    History of abnormal Pap smear: NO - age 30-65 PAP every 5 years with negative HPV co-testing recommended  PAP / HPV Latest Ref Rng & Units 5/22/2018 4/27/2015 9/4/2012   PAP (Historical) - NIL NIL NIL   HPV16 NEG:Negative Negative - -   HPV18 NEG:Negative Negative - -   HRHPV NEG:Negative Negative - -     Reviewed and updated as needed this visit by clinical staff  Tobacco  Allergies  Meds   Med Hx  Surg Hx  Fam Hx  Soc Hx       Reviewed and updated as needed this visit by Provider                   Review of Systems   Constitutional: Negative for chills and fever.   HENT: Negative for congestion, ear pain, hearing loss and sore throat.    Eyes: Negative for pain and visual disturbance.   Respiratory: Negative for cough and shortness of breath.    Cardiovascular: Negative for chest pain, palpitations and peripheral edema.   Gastrointestinal: Negative for abdominal pain, constipation, diarrhea, heartburn, hematochezia and nausea.   Breasts:  Negative for tenderness, breast mass and discharge.   Genitourinary: Negative for dysuria, frequency, genital sores, hematuria, pelvic pain, urgency, vaginal bleeding and vaginal discharge.   Musculoskeletal: Negative for arthralgias, joint swelling and myalgias.   Skin: Negative for rash.   Neurological: Negative for dizziness, weakness, headaches and paresthesias.   Psychiatric/Behavioral: Negative for mood changes. The patient is not nervous/anxious.           OBJECTIVE:   /72 (BP Location: Right arm, Patient Position: Sitting,  "Cuff Size: Adult Regular)   Pulse 51   Temp 98.1  F (36.7  C) (Temporal)   Resp 16   Ht 1.6 m (5' 3\")   Wt 70.3 kg (155 lb)   LMP 10/25/2021 (Approximate)   SpO2 98%   BMI 27.46 kg/m    Physical Exam  GENERAL: healthy, alert and no distress  EYES: Eyes grossly normal to inspection, PERRL and conjunctivae and sclerae normal  HENT: ear canals and TM's normal, nose and mouth without ulcers or lesions  NECK: no adenopathy, no asymmetry, masses, or scars and thyroid normal to palpation  RESP: lungs clear to auscultation - no rales, rhonchi or wheezes  BREAST: normal without masses, tenderness or nipple discharge and no palpable axillary masses or adenopathy  CV: regular rate and rhythm, normal S1 S2, no S3 or S4, no murmur, click or rub, no peripheral edema and peripheral pulses strong  ABDOMEN: soft, nontender, no hepatosplenomegaly, no masses and bowel sounds normal   (female): normal female external genitalia, normal urethral meatus, vaginal mucosa pink, moist, well rugated, and normal cervix/adnexa/uterus without masses or discharge  MS: no gross musculoskeletal defects noted, no edema  SKIN: no suspicious lesions or rashes  NEURO: Normal strength and tone, mentation intact and speech normal  PSYCH: mentation appears normal, affect normal/bright        ASSESSMENT/PLAN:   (Z00.00) Routine general medical examination at a health care facility  (primary encounter diagnosis)  Comment:   Plan: PAP screen with HPV - recommended age 30 - 65         years            (Z30.41) Encounter for surveillance of contraceptive pills  Comment:   Plan: levonorgestrel-ethinyl estradiol (LARISSIA)         0.1-20 MG-MCG tablet        Refills given.       Patient has been advised of split billing requirements and indicates understanding:   COUNSELING:  Reviewed preventive health counseling, as reflected in patient instructions    Estimated body mass index is 27.46 kg/m  as calculated from the following:    Height as of this " "encounter: 1.6 m (5' 3\").    Weight as of this encounter: 70.3 kg (155 lb).    Weight management plan: Discussed healthy diet and exercise guidelines    She reports that she has never smoked. She has never used smokeless tobacco.      Counseling Resources:  ATP IV Guidelines  Pooled Cohorts Equation Calculator  Breast Cancer Risk Calculator  BRCA-Related Cancer Risk Assessment: FHS-7 Tool  FRAX Risk Assessment  ICSI Preventive Guidelines  Dietary Guidelines for Americans, 2010  USDA's MyPlate  ASA Prophylaxis  Lung CA Screening    Brianna Washington NP  [facility]  "

## 2021-11-23 LAB
BKR LAB AP GYN ADEQUACY: NORMAL
BKR LAB AP GYN INTERPRETATION: NORMAL
BKR LAB AP HPV REFLEX: NORMAL
BKR LAB AP PREVIOUS ABNORMAL: NORMAL
PATH REPORT.COMMENTS IMP SPEC: NORMAL
PATH REPORT.COMMENTS IMP SPEC: NORMAL
PATH REPORT.RELEVANT HX SPEC: NORMAL

## 2021-11-26 LAB
HUMAN PAPILLOMA VIRUS 16 DNA: NEGATIVE
HUMAN PAPILLOMA VIRUS 18 DNA: NEGATIVE
HUMAN PAPILLOMA VIRUS FINAL DIAGNOSIS: NORMAL
HUMAN PAPILLOMA VIRUS OTHER HR: NEGATIVE

## 2021-12-30 ENCOUNTER — ANCILLARY PROCEDURE (OUTPATIENT)
Dept: MAMMOGRAPHY | Facility: CLINIC | Age: 41
End: 2021-12-30
Attending: NURSE PRACTITIONER
Payer: COMMERCIAL

## 2021-12-30 DIAGNOSIS — Z12.31 VISIT FOR SCREENING MAMMOGRAM: ICD-10-CM

## 2021-12-30 PROCEDURE — 77067 SCR MAMMO BI INCL CAD: CPT | Mod: GC | Performed by: STUDENT IN AN ORGANIZED HEALTH CARE EDUCATION/TRAINING PROGRAM

## 2022-01-18 ENCOUNTER — ANCILLARY PROCEDURE (OUTPATIENT)
Dept: MAMMOGRAPHY | Facility: CLINIC | Age: 42
End: 2022-01-18
Attending: NURSE PRACTITIONER
Payer: COMMERCIAL

## 2022-01-18 DIAGNOSIS — R92.8 ABNORMAL MAMMOGRAM OF RIGHT BREAST: ICD-10-CM

## 2022-01-18 PROCEDURE — 77061 BREAST TOMOSYNTHESIS UNI: CPT | Mod: RT

## 2022-01-18 PROCEDURE — 76642 ULTRASOUND BREAST LIMITED: CPT | Mod: RT

## 2022-01-18 PROCEDURE — 77065 DX MAMMO INCL CAD UNI: CPT | Mod: RT

## 2022-03-11 DIAGNOSIS — Z30.41 ENCOUNTER FOR SURVEILLANCE OF CONTRACEPTIVE PILLS: ICD-10-CM

## 2022-03-14 RX ORDER — LEVONORGESTREL/ETHIN.ESTRADIOL 0.1-0.02MG
1 TABLET ORAL DAILY
Qty: 84 TABLET | Refills: 0 | Status: SHIPPED | OUTPATIENT
Start: 2022-03-14 | End: 2022-04-19

## 2022-03-14 NOTE — TELEPHONE ENCOUNTER
Prescription approved per [facility] Refill Protocol.  JIMBO SmileyN, RN  [facility]     No

## 2022-04-19 ENCOUNTER — MYC REFILL (OUTPATIENT)
Dept: FAMILY MEDICINE | Facility: CLINIC | Age: 42
End: 2022-04-19
Payer: COMMERCIAL

## 2022-04-19 DIAGNOSIS — Z30.41 ENCOUNTER FOR SURVEILLANCE OF CONTRACEPTIVE PILLS: ICD-10-CM

## 2022-04-19 RX ORDER — LEVONORGESTREL/ETHIN.ESTRADIOL 0.1-0.02MG
1 TABLET ORAL DAILY
Qty: 84 TABLET | Refills: 1 | Status: SHIPPED | OUTPATIENT
Start: 2022-04-19 | End: 2022-10-31

## 2022-04-19 NOTE — TELEPHONE ENCOUNTER
Contraceptives Protocol Passed 04/19/2022 04:19 PM   Protocol Details  Patient is not a current smoker if age is 35 or older    Recent (12 mo) or future (30 days) visit within the authorizing provider's specialty    Medication is active on med list    No active pregnancy on record    No positive pregnancy test in past 12 months

## 2022-05-12 NOTE — PROGRESS NOTES
"  Brianna is a 41 year old who is being evaluated via a billable video visit.      How would you like to obtain your AVS? MyChart  If the video visit is dropped, the invitation should be resent by: Text to cell phone: completed  Will anyone else be joining your video visit? No    Video Start Time: 5:20    Assessment & Plan     Travel advice encounter  Video visit [place]  - atovaquone-proguanil (MALARONE) 250-100 MG tablet; Take 1 tablet by mouth daily Start 1 days before exposure to Malaria and continue daily till  7 days after exposure.  - azithromycin (ZITHROMAX) 500 MG tablet; Take 1 tablet (500 mg) by mouth daily for 3 doses Take 1 tablet a day for up to 3 days for severe diarrhea  - acetaZOLAMIDE (DIAMOX) 125 MG tablet; Take one tab every 12 hours starting 24 hours prior to [place] and continue for 24 hours to 48 hours  - HEP B VAC ADULT 3 DOSE IM; Future  - HEPATITIS A VACCINE (ADULT); Future  - TYPHOID VACCINE, IM; Future  - YELLOW FEVER IMMUNIZATN,LIVE,SUBCUT; Future    Altitude sickness preventative measures    - acetaZOLAMIDE (DIAMOX) 125 MG tablet; Take one tab every 12 hours starting 24 hours prior to [place] and continue for 24 hours to 48 hours    Left-sided low back pain with left-sided sciatica, unspecified chronicity    Worsens with prolonged sitting     Concerned about flight   - tiZANidine (ZANAFLEX) 2 MG tablet; Take 1-2 tablets (2-4 mg) by mouth 3 times daily as needed for muscle spasms      45 minutes spent on the date of the encounter doing chart review, patient visit and documentation        BMI:   Estimated body mass index is 27.46 kg/m  as calculated from the following:    Height as of 11/17/21: 1.6 m (5' 3\").    Weight as of 11/17/21: 70.3 kg (155 lb).        Scheduled for Nurse Only visit for vaccines for travel   See Patient Instructions    No follow-ups on file.    LAYNE Lambert [facility]   Brianna is a 41 year old who presents for the " following health issues  accompanied by her self.    HPI       Review of Systems   Constitutional, HEENT, cardiovascular, pulmonary, gi and gu systems are negative, except as otherwise noted.      Objective           Vitals:  No vitals were obtained today due to virtual visit.    Physical Exam   GENERAL: Healthy, alert and no distress  EYES: Eyes grossly normal to inspection.  No discharge or erythema, or obvious scleral/conjunctival abnormalities.  RESP: No audible wheeze, cough, or visible cyanosis.  No visible retractions or increased work of breathing.    SKIN: Visible skin clear. No significant rash, abnormal pigmentation or lesions.  NEURO: Cranial nerves grossly intact.  Mentation and speech appropriate for age.  PSYCH: Mentation appears normal, affect normal/bright, judgement and insight intact, normal speech and appearance well-groomed.        Video-Visit Details    Type of service:  Video Visit    Video End Time: 5:50    Originating Location (pt. Location): Home    Distant Location (provider location):  [facility]     Platform used for Video Visit: Infopia Note      Itinerary:  [place]      Departure Date: 5/25/2022      Return Date: 6-6-22      Length of Trip 2 weeks      Reason for Travel: Tourism           Urban or rural: both      Accommodations: Safari (tent)        IMMUNIZATION HISTORY  Have you received any immunizations within the past 4 weeks?  No  Patient roomed by Patricia Han is a 41 year old female seen today alone for counsultation for international travel.   Patient will be departing in  9 day(s) and  traveling with organized tour group and a friend     Patient itinerary :  [place] [facility] the [facility] [place] [facility] > [facility] > [facility] > [facility] > [facility] > [facility] river cruise 2-3 days  region of [place] which risk for Malaria, Yellow Fever, food borne illnesses and Typhoid. exposure.      Patient's activities will include sightseeing,  jungle and high altitude exposure.    Patient's country of birth is [place]    Special medical concerns: Latex allergy is contact only . Tolerates vaccine with possible Latex  Pre-travel questionnaire was completed by patient and reviewed by provider.     Vitals: There were no vitals taken for this visit.  BMI= There is no height or weight on file to calculate BMI.    EXAM:  General:  Well-nourished, well-developed in no acute distress.  Appears to be stated age, interacts appropriately and expresses understanding of information given to patient.    Current Outpatient Medications   Medication Sig Dispense Refill     levonorgestrel-ethinyl estradiol (LARISSIA) 0.1-20 MG-MCG tablet Take 1 tablet by mouth daily 84 tablet 1     loratadine (CLARITIN) 10 MG tablet Take 10 mg by mouth daily       naproxen sodium 220 MG capsule Take 220 mg by mouth 2 times daily as needed for pain       tiZANidine (ZANAFLEX) 2 MG tablet Take 1-2 tablets (2-4 mg) by mouth 3 times daily as needed for muscle spasms 30 tablet 1     Patient Active Problem List   Diagnosis     CARDIOVASCULAR SCREENING; LDL GOAL LESS THAN 160     IBS (irritable bowel syndrome)     Encounter for other general counseling or advice on contraception     Family circumstance     Right-sided low back pain with right-sided sciatica     Allergies   Allergen Reactions     Diagnostic X-Ray Materials Hives     Iodine Hives     Latex      Latex Other (See Comments)     Localized irritation.         Immunizations discussed include:   Covid 19: Up to date  Hepatitis A:  Future Ordered/, risks, benefits and side effects reviewed  Hepatitis B: Future Ordered/, risks, benefits and side effects reviewed  Influenza: Up to date  Typhoid: future Ordered/given today, risks, benefits and side effects reviewed  Rabies: Declined  reviewed managment of a animal bite or scratch (washing wound, seek medical care within 24 hours for post exposure prophylaxis )  Yellow Fever: reviewed and ordered  for future  Japanese Encephalitis: Not indicated  Meningococcus: Not indicated  Tetanus/Diphtheria: Up to date  Measles/Mumps/Rubella: Up to date  Cholera: Not needed  Polio: Not indicated  Pneumococcal: Under age of 65  Varicella: Immune by disease history per patient report  Shingrix: Not indicated  HPV:  Up to date     TB: low risk     Altitude Exposure on this trip: yes ([place] , [place] )   Past tolerance to Altitude: tolerated 8,000 ft but had symptoms at 12,000 ft     ASSESSMENT/PLAN:  Brianna was seen today for travel clinic.    Diagnoses and all orders for this visit:    Travel advice encounter  -     Cancel: TYPHOID VACCINE, IM  -     Cancel: HEPATITIS A VACCINE (ADULT)  -     Cancel: HEP B VAC ADULT 3 DOSE IM  -     Cancel: YELLOW FEVER IMMUNIZATN,LIVE,SUBCUT  -     atovaquone-proguanil (MALARONE) 250-100 MG tablet; Take 1 tablet by mouth daily Start 1 days before exposure to Malaria and continue daily till  7 days after exposure.  -     azithromycin (ZITHROMAX) 500 MG tablet; Take 1 tablet (500 mg) by mouth daily for 3 doses Take 1 tablet a day for up to 3 days for severe diarrhea  -     acetaZOLAMIDE (DIAMOX) 125 MG tablet; Take one tab every 12 hours starting 24 hours prior to [place] and continue for 24 hours to 48 hours  -     HEP B VAC ADULT 3 DOSE IM; Future  -     HEPATITIS A VACCINE (ADULT); Future  -     TYPHOID VACCINE, IM; Future  -     YELLOW FEVER IMMUNIZATN,LIVE,SUBCUT; Future    Altitude sickness preventative measures  -     acetaZOLAMIDE (DIAMOX) 125 MG tablet; Take one tab every 12 hours starting 24 hours prior to [place] and continue for 24 hours to 48 hours    Left-sided low back pain with left-sided sciatica, unspecified chronicity  -     tiZANidine (ZANAFLEX) 2 MG tablet; Take 1-2 tablets (2-4 mg) by mouth 3 times daily as needed for muscle spasms    above medication refilled for pain with prolonged sitting during travel     I have reviewed general recommendations for safe  travel   including: food/water precautions, insect precautions, roadway safety. Educational materials and [employer] report provided.    Malaraia prophylaxis recommended: Malarone  Symptomatic treatment for traveler's diarrhea: azithromycin  Altitude illness prevention and treatment: Diamox prescription given with instructions on use and education provided on Acute altitude illness recognition and prevention.      Personal protective measures reviewed including hand sanitizing and contact precautions for the prevention of viral illnesses. Cover coughs and masking  during travel and upon return.  Current COVID 19 pandemic.   Monitor / follow current CDC guidelines.      Evacuation insurance advised and resources were provided to patient.    Total visit time 35 minutes  with over 50% of time spent counseling patient as detailed above.    Juanis Delaney CNP  Certificate in Travel Health

## 2022-05-16 ENCOUNTER — VIRTUAL VISIT (OUTPATIENT)
Dept: FAMILY MEDICINE | Facility: CLINIC | Age: 42
End: 2022-05-16
Payer: COMMERCIAL

## 2022-05-16 DIAGNOSIS — M54.42 LEFT-SIDED LOW BACK PAIN WITH LEFT-SIDED SCIATICA, UNSPECIFIED CHRONICITY: Primary | ICD-10-CM

## 2022-05-16 DIAGNOSIS — Z29.89 ALTITUDE SICKNESS PREVENTATIVE MEASURES: ICD-10-CM

## 2022-05-16 DIAGNOSIS — Z71.84 TRAVEL ADVICE ENCOUNTER: ICD-10-CM

## 2022-05-16 PROCEDURE — 99213 OFFICE O/P EST LOW 20 MIN: CPT | Mod: 95 | Performed by: NURSE PRACTITIONER

## 2022-05-16 RX ORDER — TIZANIDINE 2 MG/1
2-4 TABLET ORAL 3 TIMES DAILY PRN
Qty: 30 TABLET | Refills: 1 | Status: SHIPPED | OUTPATIENT
Start: 2022-05-16 | End: 2022-11-20

## 2022-05-16 RX ORDER — AZITHROMYCIN 500 MG/1
500 TABLET, FILM COATED ORAL DAILY
Qty: 3 TABLET | Refills: 0 | Status: SHIPPED | OUTPATIENT
Start: 2022-05-16 | End: 2022-05-19

## 2022-05-16 RX ORDER — ACETAZOLAMIDE 125 MG/1
TABLET ORAL
Qty: 10 TABLET | Refills: 0 | Status: SHIPPED | OUTPATIENT
Start: 2022-05-16 | End: 2022-11-20

## 2022-05-16 RX ORDER — ATOVAQUONE AND PROGUANIL HYDROCHLORIDE 250; 100 MG/1; MG/1
1 TABLET, FILM COATED ORAL DAILY
Qty: 13 TABLET | Refills: 0 | Status: SHIPPED | OUTPATIENT
Start: 2022-05-16 | End: 2022-11-20

## 2022-05-19 ENCOUNTER — ALLIED HEALTH/NURSE VISIT (OUTPATIENT)
Dept: FAMILY MEDICINE | Facility: CLINIC | Age: 42
End: 2022-05-19
Payer: COMMERCIAL

## 2022-05-19 VITALS — TEMPERATURE: 98.7 F

## 2022-05-19 DIAGNOSIS — Z71.84 TRAVEL ADVICE ENCOUNTER: ICD-10-CM

## 2022-05-19 PROCEDURE — 90717 YELLOW FEVER VACCINE SUBQ: CPT | Mod: GA

## 2022-05-19 PROCEDURE — 90471 IMMUNIZATION ADMIN: CPT | Mod: GA

## 2022-05-19 PROCEDURE — 90746 HEPB VACCINE 3 DOSE ADULT IM: CPT | Mod: GA

## 2022-05-19 PROCEDURE — 99207 PR NO CHARGE NURSE ONLY: CPT

## 2022-05-19 PROCEDURE — 90632 HEPA VACCINE ADULT IM: CPT | Mod: GA

## 2022-05-19 PROCEDURE — 90691 TYPHOID VACCINE IM: CPT | Mod: GA

## 2022-05-19 PROCEDURE — 90472 IMMUNIZATION ADMIN EACH ADD: CPT | Mod: GA

## 2022-05-19 NOTE — PROGRESS NOTES
Prior to immunization administration, verified patients identity using patient s name and date of birth. Please see Immunization Activity for additional information.     Screening Questionnaire for Adult Immunization    Are you sick today?   No   Do you have allergies to medications, food, a vaccine component or latex?   Yes   Have you ever had a serious reaction after receiving a vaccination?   No   Do you have a long-term health problem with heart disease, lung disease, asthma, kidney disease, metabolic disease (e.g. diabetes), anemia, or other blood disorder?   No   Do you have cancer, leukemia, HIV/AIDS, or any other immune system problem?   No   In the past 3 months, have you taken medications that affect  your immune system, such as prednisone, other steroids, or anticancer drugs; drugs for the treatment of rheumatoid arthritis, Crohn s disease, or psoriasis; or have you had radiation treatments?   No   Have you had a seizure, or a brain or other nervous system problem?   No   During the past year, have you received a transfusion of blood or blood     products, or been given immune (gamma) globulin or antiviral drug?   No   For women: Are you pregnant or is there a chance you could become        pregnant during the next month?   No   Have you received any vaccinations in the past 4 weeks?   No     Immunization questionnaire was positive for at least one answer.  .        Per orders of Wendy Delaney NP, injection of YF, Typhoid, Hep A and Hep B  given by Katerina Stein CMA. Patient instructed to remain in clinic for 15 minutes afterwards, and to report any adverse reaction to me immediately.       Screening performed by Katerina Stein CMA on 5/19/2022 at 5:06 PM.

## 2022-05-20 NOTE — PATIENT INSTRUCTIONS
Thank you for visiting the [facility] : [phone]  May 19 , 2022 you received the    Hepatitis A Vaccine -     Hepatitis B Vaccine -   Yellow Fever (YF)    Typhoid - injectable. This vaccine is valid for two years.     Follow up vaccine appointments can be made as a NURSE ONLY visit at the Travel Clinic, (BE PREPARED TO WAIT, ) or at designated [facility].    If you are receiving the Rabies vaccines series, it is important that you follow the exact schedule ordered.     Pre-travel     We recommend that you purchase Medical Evacuation Insurance prior to your departure.  Https://wwwnc.cdc.gov/travel/page/insurance    [place] your travel plans with the  Department of State through STEP ( Smart Traveler Enrollment Program ) https://step.state.gov.  STEP is a free service to allow [place] citizens and nationals traveling and living abroad to enroll their trip with the nearest U.[place]. Embassy or Consulate.    Animal Exposure: Avoid all mammals even if they look healthy.  If there is a bite, scratch or even a lick, wash area immediately with soap and water for 15 minutes and seek medical care within 24 hours for evaluation of Rabies post exposure treatment.  Contact your Medical Evacuation Insurance.    COVID 19 (Sars Cov2) prevention strategies  Physical distancing: Maintain 6 foot (2m) from others.              Avoid large gatherings and public transportation.   Avoid indoor shopping malls, theaters and restaurants   Practice consistent mask wearing covering the nose, mouth and underneath the chin when unable to maintain 6 foot distance from others.  Hand washing: frequent, thorough handwashing with soap and water for 20 seconds (or using a hand  containing 60% alcohol)   Avoid touching face, nose, eyes, mouth unless you have done appropriate hand washing as above.   Clean high touch surfaces with approved disinfectant against Covid 19  (70% Ethanol ) or a bleach  solution (add 20 mL (4 teaspoons) of bleach to 1 L (1 quart) of water;)  Be careful not to breath or touch bleach.      Travel Covid 19 Testing:  updated 12/06/2021  International travelers: Pre-travel: diagnostic testing (antigen or PCR) may be required for entry:  See country specific Embassy websites or airline websites.    [place] ENTRY Requirements: Effective December 6, 2021, all international arrivals to the [place] (regardless of vaccination status or citizenship status) by air are required to have a negative predeparture COVID-19 result from a test taken no more than 1 calendar day prior to departure of the flight to the [place]. Many complex scenarios may result from the 1-day rule. For example, for a flight that arrives in the [place] on a Monday, the test must have been taken no earlier than Sunday local time in the departure city. If the itinerary contains multiple flights, the test can be taken 1 day prior to departure of the first flight or can be taken en route, as long as the connecting airport is not in the [place]. If the test is unable to be taken en route, the traveler will not be able to enter the [place], or if the test is taken en route and is positive, the traveler will have to isolate in that location. If the itinerary contains 1 or more overnight stays en route to the [place], the test must have been taken 1 calendar day before the flight that will enter the [place]; however, if the itinerary has an overnight connection due to limitations in flight availability, retesting will not be required. If the first flight within an itinerary is delayed due to severe weather, aircraft mechanical issues, or other issues outside of the traveler's control, the traveler will only need to be retested if the delay causes the original test to be 24 hours or more past the 1-day window. If a connecting flight is delayed due to any of the above issues, the traveler will only need to be retested if the delay causes the original test to be 48 hours  or more past the 1-day window. If a trip of less than 1 day is made out the [place], a viral test taken in the [place] may be used as a predeparture test as long as the test was taken no more than 1 day prior to rearrival in the [place]; however, if a delay occurs on the return trip and the predeparture test is out of the 1-day window, the traveler will need to be retested before returning to the [place]. Noncitizen nonimmigrants who are unvaccinated remain banned from entry    Post travel: CDC recommends getting tested 3-5 days after your trip AND stay home and self-quarantine for 7 days.      COVID-19 testing scheduling number for pre-travel through [facility]  [phone] (Must have an order). Available 24 hours a day.  You can also schedule through My Chart.     Post-travel illness:  Contact your provider or [facility] if you develop a fever, rash, cough, diarrhea or other symptoms for up to 1 year after travel.  Inform your healthcare provider when and where you traveled to.    Please call the [facility] with any questions [phone]  Or send your provider a 'My Chart' note.

## 2022-10-29 ENCOUNTER — MYC MEDICAL ADVICE (OUTPATIENT)
Dept: FAMILY MEDICINE | Facility: CLINIC | Age: 42
End: 2022-10-29

## 2022-10-29 DIAGNOSIS — Z30.41 ENCOUNTER FOR SURVEILLANCE OF CONTRACEPTIVE PILLS: ICD-10-CM

## 2022-10-31 RX ORDER — LEVONORGESTREL/ETHIN.ESTRADIOL 0.1-0.02MG
1 TABLET ORAL DAILY
Qty: 84 TABLET | Refills: 0 | Status: SHIPPED | OUTPATIENT
Start: 2022-10-31 | End: 2023-01-11

## 2022-10-31 NOTE — TELEPHONE ENCOUNTER
---Prescription approved per [facility] Refill Protocol.       Dinorah Jenkins RN BSN     [facility]        --Last visit:  5/19/2022     --Future Visit: none.

## 2022-11-20 ENCOUNTER — OFFICE VISIT (OUTPATIENT)
Dept: URGENT CARE | Facility: URGENT CARE | Age: 42
End: 2022-11-20
Payer: COMMERCIAL

## 2022-11-20 ENCOUNTER — NURSE TRIAGE (OUTPATIENT)
Dept: NURSING | Facility: CLINIC | Age: 42
End: 2022-11-20

## 2022-11-20 VITALS
RESPIRATION RATE: 15 BRPM | DIASTOLIC BLOOD PRESSURE: 82 MMHG | BODY MASS INDEX: 28.71 KG/M2 | WEIGHT: 156 LBS | SYSTOLIC BLOOD PRESSURE: 120 MMHG | HEIGHT: 62 IN | TEMPERATURE: 97.4 F | OXYGEN SATURATION: 99 % | HEART RATE: 51 BPM

## 2022-11-20 DIAGNOSIS — R30.0 DYSURIA: Primary | ICD-10-CM

## 2022-11-20 DIAGNOSIS — N89.8 VAGINAL DISCHARGE: ICD-10-CM

## 2022-11-20 LAB
ALBUMIN UR-MCNC: NEGATIVE MG/DL
APPEARANCE UR: CLEAR
BACTERIA #/AREA URNS HPF: ABNORMAL /HPF
BILIRUB UR QL STRIP: NEGATIVE
CLUE CELLS: ABNORMAL
COLOR UR AUTO: YELLOW
GLUCOSE UR STRIP-MCNC: NEGATIVE MG/DL
HGB UR QL STRIP: NEGATIVE
KETONES UR STRIP-MCNC: NEGATIVE MG/DL
LEUKOCYTE ESTERASE UR QL STRIP: ABNORMAL
NITRATE UR QL: NEGATIVE
PH UR STRIP: 5.5 [PH] (ref 5–7)
RBC #/AREA URNS AUTO: ABNORMAL /HPF
SP GR UR STRIP: 1.01 (ref 1–1.03)
SQUAMOUS #/AREA URNS AUTO: ABNORMAL /LPF
TRICHOMONAS, WET PREP: ABNORMAL
UROBILINOGEN UR STRIP-ACNC: 0.2 E.U./DL
WBC #/AREA URNS AUTO: ABNORMAL /HPF
WBC'S/HIGH POWER FIELD, WET PREP: ABNORMAL
YEAST, WET PREP: ABNORMAL

## 2022-11-20 PROCEDURE — 99214 OFFICE O/P EST MOD 30 MIN: CPT | Performed by: NURSE PRACTITIONER

## 2022-11-20 PROCEDURE — 87210 SMEAR WET MOUNT SALINE/INK: CPT | Performed by: NURSE PRACTITIONER

## 2022-11-20 PROCEDURE — 81001 URINALYSIS AUTO W/SCOPE: CPT | Performed by: NURSE PRACTITIONER

## 2022-11-20 RX ORDER — FLUCONAZOLE 150 MG/1
150 TABLET ORAL ONCE
Qty: 1 TABLET | Refills: 0 | Status: SHIPPED | OUTPATIENT
Start: 2022-11-20 | End: 2022-11-20

## 2022-11-20 NOTE — PROGRESS NOTES
Chief Complaint   Patient presents with     Urgent Care     UTI     Symptoms of urethra aching. Woke up with hesitation on stream of urine, some odor. Possible vaginal odor and thought she had yeast infection, used otc         ICD-10-CM    1. Dysuria  R30.0 UA macro with reflex to Microscopic and Culture - Clinc Collect     Wet prep - Clinic Collect     Urine Microscopic      2. Vaginal discharge  N89.8 fluconazole (DIFLUCAN) 150 MG tablet       suggested warm tub soaks with no additives, stop using Vagisil wipes, take Diflucan as prescribed and if symptoms fail to improve in 1 week follow-up with primary care provider.    32 minutes spent on the date of the encounter doing chart review, history and exam, documentation and further activities per the note    Results for orders placed or performed in visit on 11/20/22 (from the past 24 hour(s))   UA macro with reflex to Microscopic and Culture - Clinc Collect    Specimen: Urine, Midstream   Result Value Ref Range    Color Urine Yellow Colorless, Straw, Light Yellow, Yellow    Appearance Urine Clear Clear    Glucose Urine Negative Negative mg/dL    Bilirubin Urine Negative Negative    Ketones Urine Negative Negative mg/dL    Specific Gravity Urine 1.015 1.003 - 1.035    Blood Urine Negative Negative    pH Urine 5.5 5.0 - 7.0    Protein Albumin Urine Negative Negative mg/dL    Urobilinogen Urine 0.2 0.2, 1.0 E.U./dL    Nitrite Urine Negative Negative    Leukocyte Esterase Urine Trace (A) Negative   Wet prep - Clinic Collect    Specimen: Vagina; Swab   Result Value Ref Range    Trichomonas Absent Absent    Yeast Absent Absent    Clue Cells Absent Absent    WBCs/high power field 3+ (A) None   Urine Microscopic   Result Value Ref Range    Bacteria Urine Few (A) None Seen /HPF    RBC Urine None Seen 0-2 /HPF /HPF    WBC Urine 0-5 0-5 /HPF /HPF    Squamous Epithelials Urine Few (A) None Seen /LPF    Narrative    Urine Culture not indicated       Subjective     Brianna VICTOR  "Guille is an 42 year old female who presents to clinic today for vaginal irritation, dysuria for a couple of days. Denies fever, chills, N/V, or back pain. Denies concerns about STD.      ROS: 10 point ROS neg other than the symptoms noted above in the HPI.       Objective    /82   Pulse 51   Temp 97.4  F (36.3  C) (Temporal)   Resp 15   Ht 1.575 m (5' 2\")   Wt 70.8 kg (156 lb)   SpO2 99%   BMI 28.53 kg/m    Nurses notes and VS have been reviewed.    Physical Exam       GENERAL APPEARANCE: healthy appearing, alert     ABDOMEN:  soft, nontender, no HSM or masses and bowel sounds normal     GU_female: Between the labia minora and labia majora there is a thick white discharge, vestibule redness and irritation are evident, discharge also present in vagina     SKIN: no suspicious lesions or rashes     PSYCH: normal thought process; no significant mood disturbance    Patient Instructions   Stop Vagisil wipes    Warm tub soaks           LAYNE Beckett, CNP  [facility] Provider    The use of Dragon/[employer] dictation services may have been used to construct the content in this note; any grammatical or spelling errors are non-intentional. Please contact the author of this note directly if you are in need of any clarification.   "

## 2022-11-20 NOTE — TELEPHONE ENCOUNTER
Pt is phoning stating that she is having some urinary symptoms     Pt is stating that she can feel the entire length of her urethra when she goes to the bathroom     Pt states that she was unable to make a good stream     Pt is having some irritation during urination - but does not have pain     Pt states that her urine has a strong smell     No fever     No back pain     Per Disposition: See PCP Within 24 Hours    Care advice given per protocol and when to call back. Pt verbalized understanding and agrees to plan of care.    Tia Rausch RN  [facility] Nurse Advisor  8:59 AM 11/20/2022      COVID 19 Nurse Triage Plan/Patient Instructions    Please be aware that novel coronavirus (COVID-19) may be circulating in the community. If you develop symptoms such as fever, cough, or SOB or if you have concerns about the presence of another infection including coronavirus (COVID-19), please contact your health care provider or visit [email or web address].     Disposition/Instructions    In-Person Visit with provider recommended. Reference Visit Selection Guide.    Thank you for taking steps to prevent the spread of this virus.  o Limit your contact with others.  o Wear a simple mask to cover your cough.  o Wash your hands well and often.    Resources    [facility]: About COVID-19: [email or web address]    CDC: What to Do If You're Sick: www.cdc.gov/coronavirus/2019-ncov/about/steps-when-sick.html    CDC: Ending Home Isolation: www.cdc.gov/coronavirus/2019-ncov/hcp/disposition-in-home-patients.html     CDC: Caring for Someone: www.cdc.gov/coronavirus/2019-ncov/if-you-are-sick/care-for-someone.html     [facility]: Interim Guidance for Hospital Discharge to Home: www.health.[facility].[place]/diseases/coronavirus/hcp/hospdischarge.pdf    [facility] clinical trials (COVID-19 research studies): [email or web address]/umn-clinical-trials     Below are the COVID-19 hotlines at the [facility]  [facility] ([facility]). Interpreters are available.   o For health questions: Call 449-807-6498 or 1-575.533.7165 (7 a.m. to 7 p.m.)  o For questions about schools and childcare: Call 575-298-8780 or 1-466.762.1604 (7 a.m. to 7 p.m.)                     Reason for Disposition    Bad or foul-smelling urine    Additional Information    Negative: Shock suspected (e.g., cold/pale/clammy skin, too weak to stand, low BP, rapid pulse)    Negative: Sounds like a life-threatening emergency to the triager    Negative: Followed a female genital area injury (e.g., vagina, vulva)    Negative: Followed a male genital area injury (e.g., penis, scrotum)    Negative: Vaginal discharge    Negative: Pus (white, yellow) or bloody discharge from end of penis    Negative: [1] Taking antibiotic for urinary tract infection (UTI) AND [2] female    Negative: [1] Taking antibiotic for urinary tract infection (UTI) AND [2] male    Negative: [1] Pain or burning with passing urine (urination) AND [2] pregnant    Negative: [1] Pain or burning with passing urine (urination) AND [2] postpartum (from 0 to 6 weeks after delivery)    Negative: [1] Pain or burning with passing urine (urination) AND [2] female    Negative: [1] Pain or burning with passing urine (urination) AND [2] male    Negative: Pain or itching in the vulvar area    Negative: Pain in scrotum is main symptom    Negative: Blood in the urine is main symptom    Negative: Symptoms arising from use of a urinary catheter (e.g., coude, Benítez)    Negative: [1] Unable to urinate (or only a few drops) > 4 hours AND [2] bladder feels very full (e.g., palpable bladder or strong urge to urinate)    Negative: [1] Decreased urination and [2] drinking very little AND [2] dehydration suspected (e.g., dark urine, no urine > 12 hours, very dry mouth, very lightheaded)    Negative: Patient sounds very sick or weak to the triager    Negative: Fever > 100.4 F (38.0 C)    Negative: Side (flank) or lower back pain  present    Negative: [1] Can't control passage of urine (i.e., urinary incontinence) AND [2] new-onset (< 2 weeks) or worsening    Negative: Urinating more frequently than usual (i.e., frequency)    Protocols used: URINARY SYMPTOMS-A-AH

## 2023-01-07 ENCOUNTER — HEALTH MAINTENANCE LETTER (OUTPATIENT)
Age: 43
End: 2023-01-07

## 2023-01-11 ENCOUNTER — OFFICE VISIT (OUTPATIENT)
Dept: FAMILY MEDICINE | Facility: CLINIC | Age: 43
End: 2023-01-11
Payer: COMMERCIAL

## 2023-01-11 VITALS
SYSTOLIC BLOOD PRESSURE: 109 MMHG | OXYGEN SATURATION: 97 % | HEART RATE: 97 BPM | BODY MASS INDEX: 29.26 KG/M2 | RESPIRATION RATE: 15 BRPM | WEIGHT: 159 LBS | DIASTOLIC BLOOD PRESSURE: 79 MMHG | TEMPERATURE: 97.9 F | HEIGHT: 62 IN

## 2023-01-11 DIAGNOSIS — Z13.1 SCREENING FOR DIABETES MELLITUS: ICD-10-CM

## 2023-01-11 DIAGNOSIS — B37.31 YEAST VAGINITIS: ICD-10-CM

## 2023-01-11 DIAGNOSIS — Z23 HIGH PRIORITY FOR 2019-NCOV VACCINE: ICD-10-CM

## 2023-01-11 DIAGNOSIS — Z00.00 ROUTINE GENERAL MEDICAL EXAMINATION AT A HEALTH CARE FACILITY: Primary | ICD-10-CM

## 2023-01-11 DIAGNOSIS — Z30.41 ENCOUNTER FOR SURVEILLANCE OF CONTRACEPTIVE PILLS: ICD-10-CM

## 2023-01-11 PROCEDURE — 90471 IMMUNIZATION ADMIN: CPT | Performed by: NURSE PRACTITIONER

## 2023-01-11 PROCEDURE — 0124A COVID-19 VACCINE BIVALENT BOOSTER 12+ (PFIZER): CPT | Performed by: NURSE PRACTITIONER

## 2023-01-11 PROCEDURE — 91312 COVID-19 VACCINE BIVALENT BOOSTER 12+ (PFIZER): CPT | Performed by: NURSE PRACTITIONER

## 2023-01-11 PROCEDURE — 36415 COLL VENOUS BLD VENIPUNCTURE: CPT | Performed by: NURSE PRACTITIONER

## 2023-01-11 PROCEDURE — 99396 PREV VISIT EST AGE 40-64: CPT | Mod: 25 | Performed by: NURSE PRACTITIONER

## 2023-01-11 PROCEDURE — 82947 ASSAY GLUCOSE BLOOD QUANT: CPT | Performed by: NURSE PRACTITIONER

## 2023-01-11 PROCEDURE — 90686 IIV4 VACC NO PRSV 0.5 ML IM: CPT | Performed by: NURSE PRACTITIONER

## 2023-01-11 RX ORDER — LEVONORGESTREL/ETHIN.ESTRADIOL 0.1-0.02MG
1 TABLET ORAL DAILY
Qty: 84 TABLET | Refills: 3 | Status: SHIPPED | OUTPATIENT
Start: 2023-01-11 | End: 2024-01-16

## 2023-01-11 RX ORDER — FLUCONAZOLE 150 MG/1
150 TABLET ORAL ONCE
Qty: 1 TABLET | Refills: 5 | Status: SHIPPED | OUTPATIENT
Start: 2023-01-11 | End: 2023-01-11

## 2023-01-11 ASSESSMENT — ENCOUNTER SYMPTOMS
COUGH: 0
NAUSEA: 0
HEARTBURN: 0
JOINT SWELLING: 0
DIARRHEA: 0
ARTHRALGIAS: 0
CONSTIPATION: 0
NERVOUS/ANXIOUS: 0
SORE THROAT: 0
PALPITATIONS: 0
PARESTHESIAS: 0
BREAST MASS: 0
HEADACHES: 0
ABDOMINAL PAIN: 0
FEVER: 0
DIZZINESS: 0
WEAKNESS: 0
FREQUENCY: 0
SHORTNESS OF BREATH: 0
MYALGIAS: 0
CHILLS: 0
HEMATOCHEZIA: 0
HEMATURIA: 0
DYSURIA: 0
EYE PAIN: 0

## 2023-01-11 ASSESSMENT — PAIN SCALES - GENERAL: PAINLEVEL: NO PAIN (0)

## 2023-01-11 NOTE — PROGRESS NOTES
"  Assessment & Plan     (Z00.00) Routine general medical examination at a health care facility  (primary encounter diagnosis)  Comment:   Plan:     (Z30.41) Encounter for surveillance of contraceptive pills  Comment:   Plan: levonorgestrel-ethinyl estradiol (LARISSIA)         0.1-20 MG-MCG tablet            (B37.31) Yeast vaginitis  Comment: recurrent  Plan: fluconazole (DIFLUCAN) 150 MG tablet        Discussed needing to try a probiotic for 1-2 months to see if this is effective.   Will check glucose to rule out diabetes.  If symptoms persist, could try a different OCP, referral to gyn if symptoms continue to persist.    (Z13.1) Screening for diabetes mellitus  Comment:   Plan: Glucose            (Z23) High priority for 2019-nCoV vaccine  Comment:   Plan: COVID-19,PF,PFIZER BOOSTER BIVALENT 12+Yrs                       BMI:   Estimated body mass index is 29.08 kg/m  as calculated from the following:    Height as of this encounter: 1.575 m (5' 2\").    Weight as of this encounter: 72.1 kg (159 lb).   Weight management plan: Discussed healthy diet and exercise guidelines        No follow-ups on file.    Brianna Washington NP  [facility]   Brianna is a 42 year old, presenting for the following health issues:  Physical (Physical )      Healthy Habits:     Getting at least 3 servings of Calcium per day:  Yes    Bi-annual eye exam:  Yes    Dental care twice a year:  Yes    Sleep apnea or symptoms of sleep apnea:  None    Diet:  Vegetarian/vegan    Frequency of exercise:  4-5 days/week    Duration of exercise:  45-60 minutes    Taking medications regularly:  Yes    Medication side effects:  None    PHQ-2 Total Score: 2    Additional concerns today:  Yes             Review of Systems   Constitutional: Negative for chills and fever.   HENT: Negative for congestion, ear pain, hearing loss and sore throat.    Eyes: Negative for pain and visual disturbance.   Respiratory: Negative for " "cough and shortness of breath.    Cardiovascular: Negative for chest pain, palpitations and peripheral edema.   Gastrointestinal: Negative for abdominal pain, constipation, diarrhea, heartburn, hematochezia and nausea.   Breasts:  Negative for tenderness, breast mass and discharge.   Genitourinary: Positive for vaginal discharge. Negative for dysuria, frequency, genital sores, hematuria, pelvic pain, urgency and vaginal bleeding.   Musculoskeletal: Negative for arthralgias, joint swelling and myalgias.   Skin: Negative for rash.   Neurological: Negative for dizziness, weakness, headaches and paresthesias.   Psychiatric/Behavioral: Negative for mood changes. The patient is not nervous/anxious.       She has been having recurrent vaginal discharge, some mild itching.  She has to change her underwear several times a day.  Seems to improve with treatment for yeast infection.       Objective    /79 (BP Location: Left arm, Patient Position: Sitting, Cuff Size: Adult Large)   Pulse 97   Temp 97.9  F (36.6  C) (Temporal)   Resp 15   Ht 1.575 m (5' 2\")   Wt 72.1 kg (159 lb)   LMP 12/28/2022   SpO2 97%   BMI 29.08 kg/m    Body mass index is 29.08 kg/m .  Physical Exam   GENERAL: healthy, alert and no distress  EYES: Eyes grossly normal to inspection, PERRL and conjunctivae and sclerae normal  HENT: ear canals and TM's normal, nose and mouth without ulcers or lesions  NECK: no adenopathy, no asymmetry, masses, or scars and thyroid normal to palpation  RESP: lungs clear to auscultation - no rales, rhonchi or wheezes  BREAST: normal without masses, tenderness or nipple discharge and no palpable axillary masses or adenopathy  CV: regular rate and rhythm, normal S1 S2, no S3 or S4, no murmur, click or rub, no peripheral edema and peripheral pulses strong  ABDOMEN: soft, nontender, no hepatosplenomegaly, no masses and bowel sounds normal  MS: no gross musculoskeletal defects noted, no edema  SKIN: no suspicious " lesions or rashes  NEURO: Normal strength and tone, mentation intact and speech normal  PSYCH: mentation appears normal, affect normal/bright

## 2023-01-12 LAB
FASTING STATUS PATIENT QL REPORTED: NO
GLUCOSE SERPL-MCNC: 80 MG/DL (ref 70–99)

## 2023-04-13 ENCOUNTER — ANCILLARY PROCEDURE (OUTPATIENT)
Dept: MAMMOGRAPHY | Facility: CLINIC | Age: 43
End: 2023-04-13
Attending: NURSE PRACTITIONER
Payer: COMMERCIAL

## 2023-04-13 DIAGNOSIS — Z12.31 VISIT FOR SCREENING MAMMOGRAM: ICD-10-CM

## 2023-04-13 PROCEDURE — 77067 SCR MAMMO BI INCL CAD: CPT | Performed by: STUDENT IN AN ORGANIZED HEALTH CARE EDUCATION/TRAINING PROGRAM

## 2023-10-26 NOTE — TELEPHONE ENCOUNTER
FUTURE VISIT INFORMATION      FUTURE VISIT INFORMATION:  Date: 12/19/23  Time: 9:40 AM  Location: [facility]  REFERRAL INFORMATION:  Referring provider:    Referring providers clinic:    Reason for visit/diagnosis  per patient tinnitus confirmed [facility]     RECORDS REQUESTED FROM:       Clinic name Comments Records Status Imaging Status   [facility] -Brianna Washington NP Epic

## 2023-11-10 ENCOUNTER — LAB (OUTPATIENT)
Dept: LAB | Facility: CLINIC | Age: 43
End: 2023-11-10
Payer: COMMERCIAL

## 2023-11-10 DIAGNOSIS — E56.9 VITAMIN DEFICIENCY: ICD-10-CM

## 2023-11-10 LAB
ERYTHROCYTE [DISTWIDTH] IN BLOOD BY AUTOMATED COUNT: 11.8 % (ref 10–15)
HCT VFR BLD AUTO: 39.1 % (ref 35–47)
HGB BLD-MCNC: 12.7 G/DL (ref 11.7–15.7)
MCH RBC QN AUTO: 30.2 PG (ref 26.5–33)
MCHC RBC AUTO-ENTMCNC: 32.5 G/DL (ref 31.5–36.5)
MCV RBC AUTO: 93 FL (ref 78–100)
PLATELET # BLD AUTO: 299 10E3/UL (ref 150–450)
RBC # BLD AUTO: 4.21 10E6/UL (ref 3.8–5.2)
WBC # BLD AUTO: 8.2 10E3/UL (ref 4–11)

## 2023-11-10 PROCEDURE — 83550 IRON BINDING TEST: CPT

## 2023-11-10 PROCEDURE — 36415 COLL VENOUS BLD VENIPUNCTURE: CPT

## 2023-11-10 PROCEDURE — 85027 COMPLETE CBC AUTOMATED: CPT

## 2023-11-10 PROCEDURE — 82728 ASSAY OF FERRITIN: CPT

## 2023-11-10 PROCEDURE — 83540 ASSAY OF IRON: CPT

## 2023-11-11 LAB
FERRITIN SERPL-MCNC: 41 NG/ML (ref 6–175)
IRON BINDING CAPACITY (ROCHE): 321 UG/DL (ref 240–430)
IRON SATN MFR SERPL: 30 % (ref 15–46)
IRON SERPL-MCNC: 97 UG/DL (ref 37–145)

## 2023-11-14 ENCOUNTER — IMMUNIZATION (OUTPATIENT)
Dept: NURSING | Facility: CLINIC | Age: 43
End: 2023-11-14
Payer: COMMERCIAL

## 2023-11-14 PROCEDURE — 90480 ADMN SARSCOV2 VAC 1/ONLY CMP: CPT

## 2023-11-14 PROCEDURE — 90471 IMMUNIZATION ADMIN: CPT

## 2023-11-14 PROCEDURE — 91320 SARSCV2 VAC 30MCG TRS-SUC IM: CPT

## 2023-11-14 PROCEDURE — 90686 IIV4 VACC NO PRSV 0.5 ML IM: CPT

## 2023-12-14 DIAGNOSIS — Z01.10 ENCOUNTER FOR HEARING EXAMINATION: Primary | ICD-10-CM

## 2023-12-19 ENCOUNTER — OFFICE VISIT (OUTPATIENT)
Dept: AUDIOLOGY | Facility: CLINIC | Age: 43
End: 2023-12-19
Payer: COMMERCIAL

## 2023-12-19 ENCOUNTER — OFFICE VISIT (OUTPATIENT)
Dept: OTOLARYNGOLOGY | Facility: CLINIC | Age: 43
End: 2023-12-19
Payer: COMMERCIAL

## 2023-12-19 ENCOUNTER — PRE VISIT (OUTPATIENT)
Dept: OTOLARYNGOLOGY | Facility: CLINIC | Age: 43
End: 2023-12-19

## 2023-12-19 VITALS
BODY MASS INDEX: 29.81 KG/M2 | TEMPERATURE: 97.6 F | HEART RATE: 62 BPM | DIASTOLIC BLOOD PRESSURE: 66 MMHG | WEIGHT: 162 LBS | HEIGHT: 62 IN | SYSTOLIC BLOOD PRESSURE: 110 MMHG | OXYGEN SATURATION: 95 %

## 2023-12-19 DIAGNOSIS — R42 DIZZINESS: ICD-10-CM

## 2023-12-19 DIAGNOSIS — H93.12 TINNITUS, LEFT: ICD-10-CM

## 2023-12-19 DIAGNOSIS — Z01.10 ENCOUNTER FOR HEARING EXAMINATION: ICD-10-CM

## 2023-12-19 DIAGNOSIS — H93.12 TINNITUS OF LEFT EAR: Primary | ICD-10-CM

## 2023-12-19 PROCEDURE — 92550 TYMPANOMETRY & REFLEX THRESH: CPT | Performed by: AUDIOLOGIST

## 2023-12-19 PROCEDURE — 92557 COMPREHENSIVE HEARING TEST: CPT | Performed by: AUDIOLOGIST

## 2023-12-19 PROCEDURE — 99203 OFFICE O/P NEW LOW 30 MIN: CPT | Performed by: REGISTERED NURSE

## 2023-12-19 RX ORDER — FLUCONAZOLE 150 MG/1
TABLET ORAL
COMMUNITY
Start: 2023-10-13 | End: 2024-01-23

## 2023-12-19 ASSESSMENT — PAIN SCALES - GENERAL: PAINLEVEL: NO PAIN (0)

## 2023-12-19 NOTE — LETTER
12/19/2023       RE: Brianna Han  [address] [place] [address]     Dear Colleague,    Thank you for referring your patient, Brianna Han, to the [facility] [place] [facility]. Please see a copy of my visit note below.      Otolaryngology Clinic  December 19, 2023    Chief Complaint:   Left ear fullness and tinnitus       History of Present Illness:   Brianna Han is a 43 year old female who presents today for evaluation of left ear fullness and tinnitus.  Patient reports a new onset of left ear symptoms that began in April 2023.  Patient states that, at that time, patient had COVID with significant fever/fatigue, sinus congestion, and ear fullness.  At that time, ear fullness was bilateral with significant hearing loss and autophony.  Symptoms slowly improved over the course of a few days with crackling/popping.  Since that time, patient has continued to have some left ear fullness and intermittent tinnitus.  Hearing has returned back to baseline and patient does not experience autophony.  Reports that tinnitus continues to improve and is more quiet and intermittent than at initial presentation.      Denies dizziness with onset of symptoms but does reports that, since having shingles on her left lower trunk in 2016, she has had significant dizziness after running.  Patient does report that she has had extensive workup through neurology and cardiology regarding the symptoms without any findings.  Reports that it is felt that symptoms may be due to hypotension or dehydration.  Patient is curious if ear symptoms and dizziness could be related.    Patient denies any otalgia, otorrhea, hearing loss, facial numbness/weakness, history of frequent ear infections, or ear surgeries. History of TMJ.    Past Medical History:  Past Medical History:   Diagnosis Date     Depressive disorder      Tinnitus 4/2023    After covid  "    Uncomplicated asthma     childhood       Past Surgical History:  Past Surgical History:   Procedure Laterality Date     BREAST SURGERY      Breast reduction 2002     GENITOURINARY SURGERY      childhood urethra surgery     ZZC NONSPECIFIC PROCEDURE  06/2002    breast reduction       Medications:  Current Outpatient Medications   Medication Sig Dispense Refill     levonorgestrel-ethinyl estradiol (LARISSIA) 0.1-20 MG-MCG tablet Take 1 tablet by mouth daily 84 tablet 3     fluconazole (DIFLUCAN) 150 MG tablet  (Patient not taking: Reported on 12/19/2023)         Allergies:  Allergies   Allergen Reactions     Iodinated Contrast Media Hives     Iodine Hives     Latex      Latex Other (See Comments)     Localized irritation.        Social History:  Social History     Tobacco Use     Smoking status: Never     Smokeless tobacco: Never   Vaping Use     Vaping Use: Never used   Substance Use Topics     Alcohol use: Yes     Comment: Minimal, 1-4 drinks month     Drug use: No       ROS: 10 point ROS neg other than the symptoms noted above in the HPI.    Physical Exam:    /66   Pulse 62   Temp 97.6  F (36.4  C)   Ht 1.575 m (5' 2\")   Wt 73.5 kg (162 lb)   SpO2 95%   BMI 29.63 kg/m       Constitutional:  The patient was unaccompanied, well-groomed, and in no acute distress.     Skin: Normal:  warm and pink without rash    Neurologic: Alert and oriented x 3.  CN's III-XII within normal limits.  Voice normal.    Psychiatric: The patient's affect was calm, cooperative, and appropriate.     Communication:  Normal; communicates verbally, normal voice quality.    Respiratory: Breathing comfortably without stridor or exertion of accessory muscles.    Ears: Pinnae and tragus non-tender.  EAC's and TM's were clear.        Otologic microscope exam:    Right ear was examined under the microscope.  Normal appearing TM, nicely aerated middle ear space.    Left ear was also examined under the microscope.  Normal appearing TM, " nicely aerated middle ear space.       Audiogram: 12/19/2023 - data independently reviewed  Normal hearing bilaterally  % at 50 dB bilaterally  Acoustic Reflexes: Present in all conditions  Tympanograms: type A bilaterally    Assessment and Plan:  1. Tinnitus, left  Patient with history of left intermittent tinnitus.  Reviewed audiogram today with patient which shows normal hearing bilaterally.  There is no evidence of hearing loss that could be contributing to tinnitus symptoms.  Explained to patient that tinnitus is a central process, meaning that it is a brain generated noise.  Reviewed other factors that can contribute to tinnitus including stress, anxiety, lack of sleep, and neck/muscle tension. Patient does have a history of TMJ which could contribute to symptoms.    Explained to patient that there is no specific medication or procedure that can eliminate tinnitus, however, there are evidence-based management strategies that can be used to improve symptoms.  Discussed management strategies with patient including tinnitus masking and cognitive behavioral approaches.     Patient will follow up as needed if symptoms worsen or fail to improve with management strategies discussed above.    2. Dizziness  History of dizziness after cardio activity. There is no evidence of inner ear dysfunction that could be contributing to this symptoms. Recommend to continue to work with PCP.     Patient will follow up as needed.    Halie Stanford DNP, APRN, CNP  Otolaryngology  Head & Neck Surgery  [phone]    30 minutes spent by me on the date of the encounter doing chart review, history and exam, documentation and further activities per the note      Again, thank you for allowing me to participate in the care of your patient.      Sincerely,    Isabel Stanford, NP

## 2023-12-19 NOTE — NURSING NOTE
"Chief Complaint   Patient presents with    Consult     Tinnitus      Blood pressure 110/66, pulse 62, temperature 97.6  F (36.4  C), height 1.575 m (5' 2\"), weight 73.5 kg (162 lb), SpO2 95%, not currently breastfeeding.  Alfredo Waddell LPN    "

## 2023-12-19 NOTE — PROGRESS NOTES
AUDIOLOGY REPORT    SUMMARY: Audiology visit completed. See audiogram for results.      RECOMMENDATIONS: Follow-up with ENT.    Lorie Rapp, [facility]  Licensed Audiologist  [place] #[id number]

## 2023-12-19 NOTE — PROGRESS NOTES
Otolaryngology Clinic  December 19, 2023    Chief Complaint:   Left ear fullness and tinnitus       History of Present Illness:   Brianna Han is a 43 year old female who presents today for evaluation of left ear fullness and tinnitus.  Patient reports a new onset of left ear symptoms that began in April 2023.  Patient states that, at that time, patient had COVID with significant fever/fatigue, sinus congestion, and ear fullness.  At that time, ear fullness was bilateral with significant hearing loss and autophony.  Symptoms slowly improved over the course of a few days with crackling/popping.  Since that time, patient has continued to have some left ear fullness and intermittent tinnitus.  Hearing has returned back to baseline and patient does not experience autophony.  Reports that tinnitus continues to improve and is more quiet and intermittent than at initial presentation.      Denies dizziness with onset of symptoms but does reports that, since having shingles on her left lower trunk in 2016, she has had significant dizziness after running.  Patient does report that she has had extensive workup through neurology and cardiology regarding the symptoms without any findings.  Reports that it is felt that symptoms may be due to hypotension or dehydration.  Patient is curious if ear symptoms and dizziness could be related.    Patient denies any otalgia, otorrhea, hearing loss, facial numbness/weakness, history of frequent ear infections, or ear surgeries. History of TMJ.    Past Medical History:  Past Medical History:   Diagnosis Date     Depressive disorder      Tinnitus 4/2023    After covid     Uncomplicated asthma     childhood       Past Surgical History:  Past Surgical History:   Procedure Laterality Date     BREAST SURGERY      Breast reduction 2002     GENITOURINARY SURGERY      childhood urethra surgery     ZZC NONSPECIFIC PROCEDURE  06/2002    breast reduction       Medications:  Current Outpatient  "Medications   Medication Sig Dispense Refill     levonorgestrel-ethinyl estradiol (LARISSIA) 0.1-20 MG-MCG tablet Take 1 tablet by mouth daily 84 tablet 3     fluconazole (DIFLUCAN) 150 MG tablet  (Patient not taking: Reported on 12/19/2023)         Allergies:  Allergies   Allergen Reactions     Iodinated Contrast Media Hives     Iodine Hives     Latex      Latex Other (See Comments)     Localized irritation.        Social History:  Social History     Tobacco Use     Smoking status: Never     Smokeless tobacco: Never   Vaping Use     Vaping Use: Never used   Substance Use Topics     Alcohol use: Yes     Comment: Minimal, 1-4 drinks month     Drug use: No       ROS: 10 point ROS neg other than the symptoms noted above in the HPI.    Physical Exam:    /66   Pulse 62   Temp 97.6  F (36.4  C)   Ht 1.575 m (5' 2\")   Wt 73.5 kg (162 lb)   SpO2 95%   BMI 29.63 kg/m       Constitutional:  The patient was unaccompanied, well-groomed, and in no acute distress.     Skin: Normal:  warm and pink without rash    Neurologic: Alert and oriented x 3.  CN's III-XII within normal limits.  Voice normal.    Psychiatric: The patient's affect was calm, cooperative, and appropriate.     Communication:  Normal; communicates verbally, normal voice quality.    Respiratory: Breathing comfortably without stridor or exertion of accessory muscles.    Ears: Pinnae and tragus non-tender.  EAC's and TM's were clear.        Otologic microscope exam:    Right ear was examined under the microscope.  Normal appearing TM, nicely aerated middle ear space.    Left ear was also examined under the microscope.  Normal appearing TM, nicely aerated middle ear space.       Audiogram: 12/19/2023 - data independently reviewed  Normal hearing bilaterally  % at 50 dB bilaterally  Acoustic Reflexes: Present in all conditions  Tympanograms: type A bilaterally    Assessment and Plan:  1. Tinnitus, left  Patient with history of left intermittent " tinnitus.  Reviewed audiogram today with patient which shows normal hearing bilaterally.  There is no evidence of hearing loss that could be contributing to tinnitus symptoms.  Explained to patient that tinnitus is a central process, meaning that it is a brain generated noise.  Reviewed other factors that can contribute to tinnitus including stress, anxiety, lack of sleep, and neck/muscle tension. Patient does have a history of TMJ which could contribute to symptoms.    Explained to patient that there is no specific medication or procedure that can eliminate tinnitus, however, there are evidence-based management strategies that can be used to improve symptoms.  Discussed management strategies with patient including tinnitus masking and cognitive behavioral approaches.     Patient will follow up as needed if symptoms worsen or fail to improve with management strategies discussed above.    2. Dizziness  History of dizziness after cardio activity. There is no evidence of inner ear dysfunction that could be contributing to this symptoms. Recommend to continue to work with PCP.     Patient will follow up as needed.    Halie Stanford DNP, APRN, CNP  Otolaryngology  Head & Neck Surgery  [phone]    30 minutes spent by me on the date of the encounter doing chart review, history and exam, documentation and further activities per the note

## 2024-01-16 ENCOUNTER — MYC REFILL (OUTPATIENT)
Dept: FAMILY MEDICINE | Facility: CLINIC | Age: 44
End: 2024-01-16
Payer: COMMERCIAL

## 2024-01-16 DIAGNOSIS — Z30.41 ENCOUNTER FOR SURVEILLANCE OF CONTRACEPTIVE PILLS: ICD-10-CM

## 2024-01-17 RX ORDER — LEVONORGESTREL/ETHIN.ESTRADIOL 0.1-0.02MG
1 TABLET ORAL DAILY
Qty: 84 TABLET | Refills: 0 | Status: SHIPPED | OUTPATIENT
Start: 2024-01-17 | End: 2024-01-23

## 2024-01-23 ENCOUNTER — OFFICE VISIT (OUTPATIENT)
Dept: FAMILY MEDICINE | Facility: CLINIC | Age: 44
End: 2024-01-23
Payer: COMMERCIAL

## 2024-01-23 ENCOUNTER — PATIENT OUTREACH (OUTPATIENT)
Dept: ONCOLOGY | Facility: CLINIC | Age: 44
End: 2024-01-23

## 2024-01-23 ENCOUNTER — TELEPHONE (OUTPATIENT)
Dept: FAMILY MEDICINE | Facility: CLINIC | Age: 44
End: 2024-01-23

## 2024-01-23 VITALS
TEMPERATURE: 97.3 F | OXYGEN SATURATION: 96 % | HEART RATE: 95 BPM | RESPIRATION RATE: 14 BRPM | SYSTOLIC BLOOD PRESSURE: 116 MMHG | WEIGHT: 160.4 LBS | BODY MASS INDEX: 29.52 KG/M2 | HEIGHT: 62 IN | DIASTOLIC BLOOD PRESSURE: 64 MMHG

## 2024-01-23 DIAGNOSIS — Z30.41 ENCOUNTER FOR SURVEILLANCE OF CONTRACEPTIVE PILLS: ICD-10-CM

## 2024-01-23 DIAGNOSIS — R42 DIZZINESS: ICD-10-CM

## 2024-01-23 DIAGNOSIS — R63.5 WEIGHT GAIN: ICD-10-CM

## 2024-01-23 DIAGNOSIS — Z80.3 FAMILY HISTORY OF BREAST CANCER: ICD-10-CM

## 2024-01-23 DIAGNOSIS — Z00.00 ROUTINE GENERAL MEDICAL EXAMINATION AT A HEALTH CARE FACILITY: Primary | ICD-10-CM

## 2024-01-23 PROCEDURE — 99213 OFFICE O/P EST LOW 20 MIN: CPT | Mod: 25 | Performed by: NURSE PRACTITIONER

## 2024-01-23 PROCEDURE — 99396 PREV VISIT EST AGE 40-64: CPT | Mod: 25 | Performed by: NURSE PRACTITIONER

## 2024-01-23 RX ORDER — LEVONORGESTREL/ETHIN.ESTRADIOL 0.1-0.02MG
1 TABLET ORAL DAILY
Qty: 84 TABLET | Refills: 4 | Status: SHIPPED | OUTPATIENT
Start: 2024-01-23 | End: 2024-01-23

## 2024-01-23 RX ORDER — LEVONORGESTREL/ETHIN.ESTRADIOL 0.1-0.02MG
1 TABLET ORAL DAILY
Qty: 84 TABLET | Refills: 4 | Status: SHIPPED | OUTPATIENT
Start: 2024-01-23

## 2024-01-23 ASSESSMENT — ENCOUNTER SYMPTOMS
NERVOUS/ANXIOUS: 0
MYALGIAS: 0
COUGH: 0
FREQUENCY: 0
ARTHRALGIAS: 0
PALPITATIONS: 0
FEVER: 0
JOINT SWELLING: 0
HEMATURIA: 0
WEAKNESS: 0
NAUSEA: 0
DYSURIA: 0
CONSTIPATION: 1
PARESTHESIAS: 0
ABDOMINAL PAIN: 0
HEMATOCHEZIA: 0
HEARTBURN: 0
EYE PAIN: 0
DIZZINESS: 0
CHILLS: 0
DIARRHEA: 0
HEADACHES: 0
SORE THROAT: 0
SHORTNESS OF BREATH: 0

## 2024-01-23 ASSESSMENT — PAIN SCALES - GENERAL: PAINLEVEL: NO PAIN (0)

## 2024-01-23 NOTE — PROGRESS NOTES
Preventive Care Visit  [facility]  Brianna JENKINSNaomie Washington NP, Nurse Practitioner - Family  Jan 23, 2024       SUBJECTIVE:   Brianna is a 43 year old, presenting for the following:  Physical        1/23/2024     8:21 AM   Additional Questions   Roomed by Josefina KEYES   Accompanied by self     Healthy Habits:     Getting at least 3 servings of Calcium per day:  Yes    Bi-annual eye exam:  Yes    Dental care twice a year:  Yes    Sleep apnea or symptoms of sleep apnea:  None    Diet:  Vegetarian/vegan    Frequency of exercise:  4-5 days/week    Duration of exercise:  30-45 minutes    Taking medications regularly:  Yes    Medication side effects:  None    Additional concerns today:  Yes    Dizziness or dysequilibrium and nausea with running.  Her current pace is 10 or 11 minute mile and once she hits the 3 mile marker, she will start having symptoms.  This occurs every time and started immediately after she had shingles left chest in 2016.    She saw cardiology and had a stress test, but this wasn't long enough to trigger symptoms.  She has also seen neurology and ENT.    She has been unable to lose weight.   For 4 months she counted calories and has been exercising regularly and only lost one pound.  She joint Weight Watchers a month ago and has not lost any weight.  She exercises 4 days a week.        Today's PHQ-2 Score:       1/23/2024     8:13 AM   PHQ-2 ( 1999 [employer])   Q1: Little interest or pleasure in doing things 0   Q2: Feeling down, depressed or hopeless 0   PHQ-2 Score 0   Q1: Little interest or pleasure in doing things Not at all   Q2: Feeling down, depressed or hopeless Not at all   PHQ-2 Score 0                       Social History     Tobacco Use    Smoking status: Never    Smokeless tobacco: Never   Substance Use Topics    Alcohol use: Yes     Comment: Minimal, 1-4 drinks month           1/23/2024     8:13 AM   Alcohol Use   Prescreen: >3 drinks/day or >7 drinks/week? No      Reviewed orders with patient.  Reviewed health maintenance and updated orders accordingly - Yes      Breast Cancer Screening:    FHS-7:       11/16/2021     2:18 PM 12/30/2021    11:13 AM 12/30/2021    11:15 AM 1/11/2023     3:37 PM 4/13/2023     4:16 PM 1/23/2024     8:15 AM   Breast CA Risk Assessment (FHS-7)   Did any of your first-degree relatives have breast or ovarian cancer? Yes Yes Yes Yes Yes Yes   Did any of your relatives have bilateral breast cancer? No No No Unknown No Yes   Did any man in your family have breast cancer? No No No No No No   Did any woman in your family have breast and ovarian cancer? No No No Yes No Yes   Did any woman in your family have breast cancer before age 50 y? Yes Yes Yes Yes Yes Yes   Do you have 2 or more relatives with breast and/or ovarian cancer? Yes  Yes Yes No Yes   Do you have 2 or more relatives with breast and/or bowel cancer? No  Yes Yes No Yes         Pertinent mammograms are reviewed under the imaging tab.    History of abnormal Pap smear: NO - age 30-65 PAP every 5 years with negative HPV co-testing recommended      Latest Ref Rng & Units 11/17/2021     2:53 PM 5/22/2018    10:41 AM 4/27/2015    12:00 AM   PAP / HPV   PAP  Negative for Intraepithelial Lesion or Malignancy (NILM)      PAP (Historical)   NIL  NIL    HPV 16 DNA Negative Negative  Negative     HPV 18 DNA Negative Negative  Negative     Other HR HPV Negative Negative  Negative       Reviewed and updated as needed this visit by clinical staff   Tobacco  Allergies  Meds  Problems  Med Hx  Surg Hx  Fam Hx          Reviewed and updated as needed this visit by Provider   Tobacco  Allergies  Meds  Problems  Med Hx  Surg Hx  Fam Hx            Review of Systems   Constitutional:  Negative for chills and fever.   HENT:  Negative for congestion, ear pain, hearing loss and sore throat.    Eyes:  Negative for pain and visual disturbance.   Respiratory:  Negative for cough and shortness of breath.  "   Cardiovascular:  Negative for chest pain and palpitations.   Gastrointestinal:  Positive for constipation. Negative for abdominal pain, diarrhea and nausea.   Genitourinary:  Negative for dysuria, frequency, genital sores, hematuria and urgency.   Musculoskeletal:  Negative for arthralgias, joint swelling and myalgias.   Skin:  Negative for rash.   Neurological:  Negative for dizziness, weakness and headaches.   Psychiatric/Behavioral:  The patient is not nervous/anxious.          OBJECTIVE:   /64 (BP Location: Right arm, Patient Position: Sitting, Cuff Size: Adult Regular)   Pulse 95   Temp 97.3  F (36.3  C) (Temporal)   Resp 14   Ht 1.575 m (5' 2\")   Wt 72.8 kg (160 lb 6.4 oz)   LMP 01/02/2024   SpO2 96%   BMI 29.34 kg/m     Estimated body mass index is 29.34 kg/m  as calculated from the following:    Height as of this encounter: 1.575 m (5' 2\").    Weight as of this encounter: 72.8 kg (160 lb 6.4 oz).  Physical Exam  GENERAL: alert and no distress  EYES: Eyes grossly normal to inspection, PERRL and conjunctivae and sclerae normal  HENT: ear canals and TM's normal, nose and mouth without ulcers or lesions  NECK: no adenopathy, no asymmetry, masses, or scars  RESP: lungs clear to auscultation - no rales, rhonchi or wheezes  BREAST: normal without masses, tenderness or nipple discharge and no palpable axillary masses or adenopathy  CV: regular rate and rhythm, normal S1 S2, no S3 or S4, no murmur, click or rub, no peripheral edema  ABDOMEN: soft, nontender, no hepatosplenomegaly, no masses and bowel sounds normal  MS: no gross musculoskeletal defects noted, no edema  SKIN: no suspicious lesions or rashes  NEURO: Normal strength and tone, mentation intact and speech normal  PSYCH: mentation appears normal, affect normal/bright  LYMPH: no cervical, supraclavicular, axillary, or inguinal adenopathy        ASSESSMENT/PLAN:   (Z00.00) Routine general medical examination at a health care facility  " "(primary encounter diagnosis)  Comment:   Plan: Lipid panel reflex to direct LDL Fasting,         Glucose            (R42) Dizziness  Comment:   Plan: Adult Cardiology Eval  Referral        Will refer back to cardiology for further evaluation, please see detailed note in HPI.     (Z80.3) Family history of breast cancer  Comment:   Plan: Adult Oncology/Hematology  Referral        Will refer to Genetic Counselor.     (Z30.41) Encounter for surveillance of contraceptive pills  Comment:   Plan: DISCONTINUED: levonorgestrel-ethinyl estradiol         (LARISSIA) 0.1-20 MG-MCG tablet        The current medical regimen is effective;  continue present plan and medications.     (R63.5) Weight gain  Comment:   Plan: TSH with free T4 reflex        Will check a TSH.  Discussed continuing on Weight Watchers and if she continues to not be successful and her BMI is 30 or higher, she could make a virtual visit to discuss weight loss medications.            Counseling  Reviewed preventive health counseling, as reflected in patient instructions      BMI  Estimated body mass index is 29.34 kg/m  as calculated from the following:    Height as of this encounter: 1.575 m (5' 2\").    Weight as of this encounter: 72.8 kg (160 lb 6.4 oz).         She reports that she has never smoked. She has never used smokeless tobacco.          Signed Electronically by: Brianna Washington NP  "

## 2024-01-23 NOTE — PROGRESS NOTES
Writer received referral to Cancer Risk Management/Genetic Counseling.    Referred for:     family history of breast cancer        Referral reviewed for appropriate plan, and sent to New Patient Scheduling ([phone]) for completion.    Briana Cedillo, RN, BSN  Oncology New Patient Nurse Navigator   [facility]  [phone]

## 2024-01-23 NOTE — TELEPHONE ENCOUNTER
Brianna VILLEGAS Sphp [id number] minutes ago (10:40 AM)       Topic: Non-Medical Question.     Hi,      I recently had my pharmacy updated to mail order but it went to [facility] not [facility]. I can't get my meds through [facility] as my insurance is [employer]. Can my prescription please be changed to [facility]?     Thank you

## 2024-01-23 NOTE — TELEPHONE ENCOUNTER
Order sent to [facility] - [place], [place] - [address] per patient's request and was ordered earlier today during office visit with NASH Washington CNP.    JIMBO SmileyN, RN-BC  [facility]

## 2024-01-24 ENCOUNTER — LAB (OUTPATIENT)
Dept: LAB | Facility: CLINIC | Age: 44
End: 2024-01-24
Payer: COMMERCIAL

## 2024-01-24 DIAGNOSIS — Z00.00 ROUTINE GENERAL MEDICAL EXAMINATION AT A HEALTH CARE FACILITY: ICD-10-CM

## 2024-01-24 DIAGNOSIS — R63.5 WEIGHT GAIN: ICD-10-CM

## 2024-01-24 LAB
CHOLEST SERPL-MCNC: 175 MG/DL
FASTING STATUS PATIENT QL REPORTED: YES
FASTING STATUS PATIENT QL REPORTED: YES
GLUCOSE SERPL-MCNC: 77 MG/DL (ref 70–99)
HDLC SERPL-MCNC: 52 MG/DL
LDLC SERPL CALC-MCNC: 106 MG/DL
NONHDLC SERPL-MCNC: 123 MG/DL
TRIGL SERPL-MCNC: 84 MG/DL
TSH SERPL DL<=0.005 MIU/L-ACNC: 2.18 UIU/ML (ref 0.3–4.2)

## 2024-01-24 PROCEDURE — 80061 LIPID PANEL: CPT

## 2024-01-24 PROCEDURE — 82947 ASSAY GLUCOSE BLOOD QUANT: CPT

## 2024-01-24 PROCEDURE — 36415 COLL VENOUS BLD VENIPUNCTURE: CPT

## 2024-01-24 PROCEDURE — 84443 ASSAY THYROID STIM HORMONE: CPT

## 2024-03-29 ENCOUNTER — OFFICE VISIT (OUTPATIENT)
Dept: CARDIOLOGY | Facility: CLINIC | Age: 44
End: 2024-03-29
Attending: NURSE PRACTITIONER
Payer: COMMERCIAL

## 2024-03-29 ENCOUNTER — ORDERS ONLY (AUTO-RELEASED) (OUTPATIENT)
Dept: CARDIOLOGY | Facility: CLINIC | Age: 44
End: 2024-03-29
Payer: COMMERCIAL

## 2024-03-29 VITALS
RESPIRATION RATE: 16 BRPM | SYSTOLIC BLOOD PRESSURE: 94 MMHG | DIASTOLIC BLOOD PRESSURE: 66 MMHG | HEIGHT: 62 IN | BODY MASS INDEX: 29.44 KG/M2 | WEIGHT: 160 LBS | HEART RATE: 57 BPM

## 2024-03-29 DIAGNOSIS — R42 DIZZINESS: ICD-10-CM

## 2024-03-29 PROCEDURE — 99204 OFFICE O/P NEW MOD 45 MIN: CPT | Performed by: INTERNAL MEDICINE

## 2024-03-29 RX ORDER — MIDODRINE HYDROCHLORIDE 2.5 MG/1
2.5 TABLET ORAL PRN
Qty: 30 TABLET | Refills: 3 | Status: SHIPPED | OUTPATIENT
Start: 2024-03-29

## 2024-03-29 NOTE — PATIENT INSTRUCTIONS
Ms. Brianna Han,     It was a pleasure to see you in the office today. My recommendations for you include:   1. Trial of midodrine to take as needed prior to runs  2. Hydration on running days  3. Zio patch to evaluate rhythm     Please do not hesitate to call the [facility] clinic with any questions or concerns at [phone].    Sincerely,     Crystal Reyes MD

## 2024-03-29 NOTE — LETTER
3/29/2024    Brianna Washington, NP  [address], [address]  [place] [address]    RE: Brianna Han       Dear Colleague,     I had the pleasure of seeing Brianna Han in the [facility].    HEART CARE ENCOUNTER CONSULTATON NOTE      [facility]  [phone]      Assessment/Recommendations   Assessment:  1.  Presyncope: Episodes of nausea with unsteadiness and dizziness into her long runs which has been going on for years now.  No other associated symptoms of shortness of breath or chest pain.  Normal stress testing in the past and no structural heart disease noted.  She runs a low blood pressure is certainly this could be contributing.  Discussed increasing fluids, electrolytes and taking a low-dose of midodrine on the days that she is going to be running to see if this will help.  She also had concerns regarding a PFO and her mom.  Her mom had a stroke at a young age and underwent PFO closure.  I think it is unlikely that this would be causing her symptoms however could consider an echo with bubble study to exclude a large shunt.      Plan:  1.  As needed midodrine  2.  Compression stockings, increased fluids particularly on days that you run  3.  A Zio patch to evaluate rhythm during these episodes       History of Present Illness/Subjective    HPI: Brianna Han is a 43 year old female who I am seeing today for initial consultation due to episodic dizziness.  She has had the symptoms for years now and was seen by cardiologist back in 2017 for the same complaint.  She notes that 1 to 2 miles into her runs she becomes dizzy with nausea and has trouble with her equilibrium.  No syncopal episodes.  The symptoms only occur while running.  She underwent an exercise stress echo back in 17 and this looks great.  She was able to do 13 minutes and no structural heart disease or ischemia noted on her study.  Denies any issues with palpitations, swelling.  She does run a  "low blood pressure.  She has tried using compression stockings and increasing fluids electrolytes without much improvement.  She does note that she has some trouble with allergies and postnasal drip and feels that her symptoms could possibly be related to this.    Exercise stress echocardiogram 2/10/2017  Interpretation Summary  This was a normal stress echocardiogram with no evidence of stress-induced  ischemia.  Good exercise tolerance ( 13:00 Patrick protocol /15.4 Mets)  _____________________________________________________________________________  __     Stress  RPP 31055.  Exercise was stopped due to fatigue.  The patient exhibited no chest pain during exercise.  Target Heart Rate was achieved.  There were no ST segment changes observed with stress.  The Duke treadmill score was low risk ( >5 Duke score).  This was a normal stress echocardiogram with no evidence of stress-induced  ischemia.  The visual ejection fraction is estimated at >70%.  Left ventricular cavity size decreases with exercise.  Global LV systolic function augments with exercise.  Normal resting wall motion and no stress-induced wall motion abnormality.     Baseline  The patient is in normal sinus rhythm.  Resting ECG is normal.  The visual ejection fraction is estimated at 55-60%.  No regional wall motion abnormalities noted.       Physical Examination  Review of Systems   Vitals: BP 94/66 (BP Location: Right arm, Patient Position: Sitting, Cuff Size: Adult Regular)   Pulse 57   Resp 16   Ht 1.575 m (5' 2\")   Wt 72.6 kg (160 lb)   BMI 29.26 kg/m    BMI= Body mass index is 29.26 kg/m .  Wt Readings from Last 3 Encounters:   03/29/24 72.6 kg (160 lb)   01/23/24 72.8 kg (160 lb 6.4 oz)   12/19/23 73.5 kg (162 lb)       General Appearance:   no distress, normal body habitus   ENT/Mouth: membranes moist, no oral lesions or bleeding gums.      EYES:  no scleral icterus, normal conjunctivae   Neck: no carotid bruits or thyromegaly   Chest/Lungs: "   lungs are clear to auscultation   Cardiovascular:   Regular. Normal first and second heart sounds with no murmur no edema bilaterally    Abdomen:  bowel sounds are present   Extremities: no cyanosis or clubbing   Skin: no xanthelasma, warm.    Neurologic: normal  bilateral, no tremors     Psychiatric: alert and oriented x3, calm        Please refer above for cardiac ROS details.        Medical History  Surgical History Family History Social History   Past Medical History:   Diagnosis Date    Depressive disorder     Tinnitus 4/2023    After covid    Uncomplicated asthma     childhood     Past Surgical History:   Procedure Laterality Date    BREAST SURGERY      Breast reduction 2002    GENITOURINARY SURGERY      childhood urethra surgery    ZZC NONSPECIFIC PROCEDURE  06/2002    breast reduction     Family History   Problem Relation Age of Onset    Breast Cancer Mother         X2    Depression Mother     Cerebrovascular Disease Mother 66    Back Pain Mother     Cancer Mother         Breast cancer twice, multiple types    Depression Sister     Back Pain Sister     Migraines Sister     Anxiety Disorder Sister     Stomach Problem Sister     Alcohol/Drug Brother     Cancer Maternal Grandmother     Breast Cancer Maternal Grandmother     Back Pain Maternal Grandmother         sciatica     Colon Cancer Maternal Grandfather         70s    Pancreatic Cancer Paternal Grandmother     Cancer Paternal Grandmother     Cancer Paternal Grandfather         Social History     Socioeconomic History    Marital status: Single     Spouse name: Not on file    Number of children: 0    Years of education: Not on file    Highest education level: Not on file   Occupational History    Occupation:      Employer: [employer]     Comment:    Tobacco Use    Smoking status: Never    Smokeless tobacco: Never   Vaping Use    Vaping Use: Never used   Substance and Sexual Activity    Alcohol use: Yes     Comment: Minimal, 1-4  drinks month    Drug use: No    Sexual activity: Yes     Partners: Male     Birth control/protection: Condom, Pill   Other Topics Concern    Parent/sibling w/ CABG, MI or angioplasty before 65F 55M? No     Service Not Asked    Blood Transfusions Not Asked    Caffeine Concern Yes     Comment: thermos of coffee a day     Occupational Exposure Not Asked    Hobby Hazards Not Asked    Sleep Concern Not Asked    Stress Concern Not Asked    Weight Concern Not Asked    Special Diet No     Comment: vegitarian    Back Care Not Asked    Exercise No     Comment: 3-4 times a week     Bike Helmet Not Asked    Seat Belt Not Asked    Self-Exams Not Asked   Social History Narrative    Dairy/d 3 servings/d.     Caffeine 1 servings/d    Exercise 4 x week    Sunscreen used - Yes    Seatbelts used - Yes    Working smoke/CO detectors in the home - Yes    Guns stored in the home - NO    Self Breast Exams - Yes    Eye Exam up to date - 2009    Dental Exam up to date - Yes    Pap Smear up to date - Yes    Mammogram up to date - NA    Dexa Scan up to date - NA    Flex Sig / Colonoscopy up to date - NA    Immunizations up to date - 2010    Abuse: Current or Past(Physical, Sexual or Emotional)- No    Do you feel safe in your environment - Yes        Updated: 2010             Social Determinants of Health     Financial Resource Strain: Low Risk  (1/23/2024)    Financial Resource Strain     Within the past 12 months, have you or your family members you live with been unable to get utilities (heat, electricity) when it was really needed?: No   Food Insecurity: Low Risk  (1/23/2024)    Food Insecurity     Within the past 12 months, did you worry that your food would run out before you got money to buy more?: No     Within the past 12 months, did the food you bought just not last and you didn t have money to get more?: No   Transportation Needs: Low Risk  (1/23/2024)    Transportation Needs     Within the past 12 months, has lack of  "transportation kept you from medical appointments, getting your medicines, non-medical meetings or appointments, work, or from getting things that you need?: No   Physical Activity: Not on file   Stress: Not on file   Social Connections: Not on file   Interpersonal Safety: Low Risk  (1/23/2024)    Interpersonal Safety     Do you feel physically and emotionally safe where you currently live?: Yes     Within the past 12 months, have you been hit, slapped, kicked or otherwise physically hurt by someone?: No     Within the past 12 months, have you been humiliated or emotionally abused in other ways by your partner or ex-partner?: No   Housing Stability: Low Risk  (1/23/2024)    Housing Stability     Do you have housing? : Yes     Are you worried about losing your housing?: No           Medications  Allergies   Current Outpatient Medications   Medication Sig Dispense Refill    levonorgestrel-ethinyl estradiol (LARISSIA) 0.1-20 MG-MCG tablet Take 1 tablet by mouth daily 84 tablet 4    midodrine (PROAMATINE) 2.5 MG tablet Take 1 tablet (2.5 mg) by mouth as needed (as needed on days of running) 30 tablet 3       Allergies   Allergen Reactions    Iodinated Contrast Media Hives    Iodine Hives    Latex     Latex Other (See Comments)     Localized irritation.          Lab Results    Chemistry/lipid CBC Cardiac Enzymes/BNP/TSH/INR   Recent Labs   Lab Test 01/24/24  1139   CHOL 175   HDL 52   *   TRIG 84     Recent Labs   Lab Test 01/24/24  1139 09/04/20  0751 11/09/16  0904   * 86 73     Recent Labs   Lab Test 01/24/24  1139 11/09/16  0904 02/08/16  1607   NA  --   --  139   POTASSIUM  --   --  4.3   CHLORIDE  --   --  107   CO2  --   --  27   GLC 77   < > 71   BUN  --   --  11   CR  --   --  0.76   GFRESTIMATED  --   --  87   ADEN  --   --  9.2    < > = values in this interval not displayed.     Recent Labs   Lab Test 02/08/16  1607   CR 0.76     No results for input(s): \"A1C\" in the last 61564 hours.       Recent " "Labs   Lab Test 11/10/23  1424   WBC 8.2   HGB 12.7   HCT 39.1   MCV 93        Recent Labs   Lab Test 11/10/23  1424 09/04/20  0751 11/09/16  0904   HGB 12.7 12.6 13.2    No results for input(s): \"TROPONINI\" in the last 38397 hours.  No results for input(s): \"BNP\", \"NTBNPI\", \"NTBNP\" in the last 37663 hours.  Recent Labs   Lab Test 01/24/24  1139   TSH 2.18     No results for input(s): \"INR\" in the last 08514 hours.     Crystal Reyes MD              Thank you for allowing me to participate in the care of your patient.      Sincerely,     Crystal Reyes MD     [facility]  cc:   [place]  [address], [facility] [address]  [place] [address]      "

## 2024-03-29 NOTE — PROGRESS NOTES
HEART CARE ENCOUNTER CONSULTATON NOTE      [facility]  [phone]      Assessment/Recommendations   Assessment:  1.  Presyncope: Episodes of nausea with unsteadiness and dizziness into her long runs which has been going on for years now.  No other associated symptoms of shortness of breath or chest pain.  Normal stress testing in the past and no structural heart disease noted.  She runs a low blood pressure is certainly this could be contributing.  Discussed increasing fluids, electrolytes and taking a low-dose of midodrine on the days that she is going to be running to see if this will help.  She also had concerns regarding a PFO and her mom.  Her mom had a stroke at a young age and underwent PFO closure.  I think it is unlikely that this would be causing her symptoms however could consider an echo with bubble study to exclude a large shunt.      Plan:  1.  As needed midodrine  2.  Compression stockings, increased fluids particularly on days that you run  3.  A Zio patch to evaluate rhythm during these episodes       History of Present Illness/Subjective    HPI: Brianna Han is a 43 year old female who I am seeing today for initial consultation due to episodic dizziness.  She has had the symptoms for years now and was seen by cardiologist back in 2017 for the same complaint.  She notes that 1 to 2 miles into her runs she becomes dizzy with nausea and has trouble with her equilibrium.  No syncopal episodes.  The symptoms only occur while running.  She underwent an exercise stress echo back in 17 and this looks great.  She was able to do 13 minutes and no structural heart disease or ischemia noted on her study.  Denies any issues with palpitations, swelling.  She does run a low blood pressure.  She has tried using compression stockings and increasing fluids electrolytes without much improvement.  She does note that she has some trouble with allergies and postnasal drip and feels that her  "symptoms could possibly be related to this.    Exercise stress echocardiogram 2/10/2017  Interpretation Summary  This was a normal stress echocardiogram with no evidence of stress-induced  ischemia.  Good exercise tolerance ( 13:00 Patrick protocol /15.4 Mets)  _____________________________________________________________________________  __     Stress  RPP 80544.  Exercise was stopped due to fatigue.  The patient exhibited no chest pain during exercise.  Target Heart Rate was achieved.  There were no ST segment changes observed with stress.  The Duke treadmill score was low risk ( >5 Duke score).  This was a normal stress echocardiogram with no evidence of stress-induced  ischemia.  The visual ejection fraction is estimated at >70%.  Left ventricular cavity size decreases with exercise.  Global LV systolic function augments with exercise.  Normal resting wall motion and no stress-induced wall motion abnormality.     Baseline  The patient is in normal sinus rhythm.  Resting ECG is normal.  The visual ejection fraction is estimated at 55-60%.  No regional wall motion abnormalities noted.       Physical Examination  Review of Systems   Vitals: BP 94/66 (BP Location: Right arm, Patient Position: Sitting, Cuff Size: Adult Regular)   Pulse 57   Resp 16   Ht 1.575 m (5' 2\")   Wt 72.6 kg (160 lb)   BMI 29.26 kg/m    BMI= Body mass index is 29.26 kg/m .  Wt Readings from Last 3 Encounters:   03/29/24 72.6 kg (160 lb)   01/23/24 72.8 kg (160 lb 6.4 oz)   12/19/23 73.5 kg (162 lb)       General Appearance:   no distress, normal body habitus   ENT/Mouth: membranes moist, no oral lesions or bleeding gums.      EYES:  no scleral icterus, normal conjunctivae   Neck: no carotid bruits or thyromegaly   Chest/Lungs:   lungs are clear to auscultation   Cardiovascular:   Regular. Normal first and second heart sounds with no murmur no edema bilaterally    Abdomen:  bowel sounds are present   Extremities: no cyanosis or clubbing "   Skin: no xanthelasma, warm.    Neurologic: normal  bilateral, no tremors     Psychiatric: alert and oriented x3, calm        Please refer above for cardiac ROS details.        Medical History  Surgical History Family History Social History   Past Medical History:   Diagnosis Date    Depressive disorder     Tinnitus 4/2023    After covid    Uncomplicated asthma     childhood     Past Surgical History:   Procedure Laterality Date    BREAST SURGERY      Breast reduction 2002    GENITOURINARY SURGERY      childhood urethra surgery    ZZC NONSPECIFIC PROCEDURE  06/2002    breast reduction     Family History   Problem Relation Age of Onset    Breast Cancer Mother         X2    Depression Mother     Cerebrovascular Disease Mother 66    Back Pain Mother     Cancer Mother         Breast cancer twice, multiple types    Depression Sister     Back Pain Sister     Migraines Sister     Anxiety Disorder Sister     Stomach Problem Sister     Alcohol/Drug Brother     Cancer Maternal Grandmother     Breast Cancer Maternal Grandmother     Back Pain Maternal Grandmother         sciatica     Colon Cancer Maternal Grandfather         70s    Pancreatic Cancer Paternal Grandmother     Cancer Paternal Grandmother     Cancer Paternal Grandfather         Social History     Socioeconomic History    Marital status: Single     Spouse name: Not on file    Number of children: 0    Years of education: Not on file    Highest education level: Not on file   Occupational History    Occupation:      Employer: [email or web address]     Comment: cyber security   Tobacco Use    Smoking status: Never    Smokeless tobacco: Never   Vaping Use    Vaping Use: Never used   Substance and Sexual Activity    Alcohol use: Yes     Comment: Minimal, 1-4 drinks month    Drug use: No    Sexual activity: Yes     Partners: Male     Birth control/protection: Condom, Pill   Other Topics Concern    Parent/sibling w/ CABG, MI or angioplasty before 65F 55M? No      Service Not Asked    Blood Transfusions Not Asked    Caffeine Concern Yes     Comment: thermos of coffee a day     Occupational Exposure Not Asked    Hobby Hazards Not Asked    Sleep Concern Not Asked    Stress Concern Not Asked    Weight Concern Not Asked    Special Diet No     Comment: vegitarian    Back Care Not Asked    Exercise No     Comment: 3-4 times a week     Bike Helmet Not Asked    Seat Belt Not Asked    Self-Exams Not Asked   Social History Narrative    Dairy/d 3 servings/d.     Caffeine 1 servings/d    Exercise 4 x week    Sunscreen used - Yes    Seatbelts used - Yes    Working smoke/CO detectors in the home - Yes    Guns stored in the home - NO    Self Breast Exams - Yes    Eye Exam up to date - 2009    Dental Exam up to date - Yes    Pap Smear up to date - Yes    Mammogram up to date - NA    Dexa Scan up to date - NA    Flex Sig / Colonoscopy up to date - NA    Immunizations up to date - 2010    Abuse: Current or Past(Physical, Sexual or Emotional)- No    Do you feel safe in your environment - Yes        Updated: 2010             Social Determinants of Health     Financial Resource Strain: Low Risk  (1/23/2024)    Financial Resource Strain     Within the past 12 months, have you or your family members you live with been unable to get utilities (heat, electricity) when it was really needed?: No   Food Insecurity: Low Risk  (1/23/2024)    Food Insecurity     Within the past 12 months, did you worry that your food would run out before you got money to buy more?: No     Within the past 12 months, did the food you bought just not last and you didn t have money to get more?: No   Transportation Needs: Low Risk  (1/23/2024)    Transportation Needs     Within the past 12 months, has lack of transportation kept you from medical appointments, getting your medicines, non-medical meetings or appointments, work, or from getting things that you need?: No   Physical Activity: Not on file   Stress: Not on file  "  Social Connections: Not on file   Interpersonal Safety: Low Risk  (1/23/2024)    Interpersonal Safety     Do you feel physically and emotionally safe where you currently live?: Yes     Within the past 12 months, have you been hit, slapped, kicked or otherwise physically hurt by someone?: No     Within the past 12 months, have you been humiliated or emotionally abused in other ways by your partner or ex-partner?: No   Housing Stability: Low Risk  (1/23/2024)    Housing Stability     Do you have housing? : Yes     Are you worried about losing your housing?: No           Medications  Allergies   Current Outpatient Medications   Medication Sig Dispense Refill    levonorgestrel-ethinyl estradiol (LARISSIA) 0.1-20 MG-MCG tablet Take 1 tablet by mouth daily 84 tablet 4    midodrine (PROAMATINE) 2.5 MG tablet Take 1 tablet (2.5 mg) by mouth as needed (as needed on days of running) 30 tablet 3       Allergies   Allergen Reactions    Iodinated Contrast Media Hives    Iodine Hives    Latex     Latex Other (See Comments)     Localized irritation.          Lab Results    Chemistry/lipid CBC Cardiac Enzymes/BNP/TSH/INR   Recent Labs   Lab Test 01/24/24  1139   CHOL 175   HDL 52   *   TRIG 84     Recent Labs   Lab Test 01/24/24  1139 09/04/20  0751 11/09/16  0904   * 86 73     Recent Labs   Lab Test 01/24/24  1139 11/09/16  0904 02/08/16  1607   NA  --   --  139   POTASSIUM  --   --  4.3   CHLORIDE  --   --  107   CO2  --   --  27   GLC 77   < > 71   BUN  --   --  11   CR  --   --  0.76   GFRESTIMATED  --   --  87   ADEN  --   --  9.2    < > = values in this interval not displayed.     Recent Labs   Lab Test 02/08/16  1607   CR 0.76     No results for input(s): \"A1C\" in the last 16057 hours.       Recent Labs   Lab Test 11/10/23  1424   WBC 8.2   HGB 12.7   HCT 39.1   MCV 93        Recent Labs   Lab Test 11/10/23  1424 09/04/20  0751 11/09/16  0904   HGB 12.7 12.6 13.2    No results for input(s): \"TROPONINI\" " "in the last 59492 hours.  No results for input(s): \"BNP\", \"NTBNPI\", \"NTBNP\" in the last 00099 hours.  Recent Labs   Lab Test 01/24/24  1139   TSH 2.18     No results for input(s): \"INR\" in the last 72631 hours.     Crystal Reyes MD                                      "

## 2024-04-19 PROCEDURE — 93244 EXT ECG>48HR<7D REV&INTERPJ: CPT | Performed by: INTERNAL MEDICINE

## 2024-05-14 ENCOUNTER — ANCILLARY PROCEDURE (OUTPATIENT)
Dept: MAMMOGRAPHY | Facility: CLINIC | Age: 44
End: 2024-05-14
Attending: NURSE PRACTITIONER
Payer: COMMERCIAL

## 2024-05-14 DIAGNOSIS — Z12.31 VISIT FOR SCREENING MAMMOGRAM: ICD-10-CM

## 2024-05-14 PROCEDURE — 77067 SCR MAMMO BI INCL CAD: CPT | Mod: GC | Performed by: STUDENT IN AN ORGANIZED HEALTH CARE EDUCATION/TRAINING PROGRAM

## 2025-02-25 ENCOUNTER — MYC REFILL (OUTPATIENT)
Dept: FAMILY MEDICINE | Facility: CLINIC | Age: 45
End: 2025-02-25
Payer: COMMERCIAL

## 2025-02-25 DIAGNOSIS — Z30.41 ENCOUNTER FOR SURVEILLANCE OF CONTRACEPTIVE PILLS: ICD-10-CM

## 2025-02-25 RX ORDER — LEVONORGESTREL/ETHIN.ESTRADIOL 0.1-0.02MG
1 TABLET ORAL DAILY
Qty: 84 TABLET | Refills: 0 | Status: SHIPPED | OUTPATIENT
Start: 2025-02-25

## 2025-02-25 NOTE — TELEPHONE ENCOUNTER
Clinic Care Team-Please contact patient to schedule annual physical.    Thank you!  JIMBO SmileyN, RN-[facility]

## 2025-03-24 ENCOUNTER — OFFICE VISIT (OUTPATIENT)
Dept: FAMILY MEDICINE | Facility: CLINIC | Age: 45
End: 2025-03-24
Payer: COMMERCIAL

## 2025-03-24 VITALS
RESPIRATION RATE: 16 BRPM | BODY MASS INDEX: 29.28 KG/M2 | TEMPERATURE: 97.2 F | DIASTOLIC BLOOD PRESSURE: 68 MMHG | OXYGEN SATURATION: 99 % | HEART RATE: 58 BPM | SYSTOLIC BLOOD PRESSURE: 100 MMHG | HEIGHT: 62 IN | WEIGHT: 159.1 LBS

## 2025-03-24 DIAGNOSIS — Z80.3 FAMILY HISTORY OF BREAST CANCER: ICD-10-CM

## 2025-03-24 DIAGNOSIS — Z23 NEED FOR COVID-19 VACCINE: ICD-10-CM

## 2025-03-24 DIAGNOSIS — Z00.00 ROUTINE GENERAL MEDICAL EXAMINATION AT A HEALTH CARE FACILITY: Primary | ICD-10-CM

## 2025-03-24 DIAGNOSIS — Z30.41 ENCOUNTER FOR SURVEILLANCE OF CONTRACEPTIVE PILLS: ICD-10-CM

## 2025-03-24 DIAGNOSIS — Z23 NEED FOR INFLUENZA VACCINATION: ICD-10-CM

## 2025-03-24 PROCEDURE — 90656 IIV3 VACC NO PRSV 0.5 ML IM: CPT | Performed by: NURSE PRACTITIONER

## 2025-03-24 PROCEDURE — 99396 PREV VISIT EST AGE 40-64: CPT | Mod: 25 | Performed by: NURSE PRACTITIONER

## 2025-03-24 PROCEDURE — 3074F SYST BP LT 130 MM HG: CPT | Performed by: NURSE PRACTITIONER

## 2025-03-24 PROCEDURE — 91320 SARSCV2 VAC 30MCG TRS-SUC IM: CPT | Performed by: NURSE PRACTITIONER

## 2025-03-24 PROCEDURE — 90480 ADMN SARSCOV2 VAC 1/ONLY CMP: CPT | Performed by: NURSE PRACTITIONER

## 2025-03-24 PROCEDURE — 1126F AMNT PAIN NOTED NONE PRSNT: CPT | Performed by: NURSE PRACTITIONER

## 2025-03-24 PROCEDURE — 90471 IMMUNIZATION ADMIN: CPT | Performed by: NURSE PRACTITIONER

## 2025-03-24 PROCEDURE — 3078F DIAST BP <80 MM HG: CPT | Performed by: NURSE PRACTITIONER

## 2025-03-24 RX ORDER — ESTRADIOL 1 MG/1
1 TABLET ORAL DAILY
COMMUNITY
Start: 2025-03-24

## 2025-03-24 RX ORDER — PROGESTERONE 100 MG/1
100 CAPSULE ORAL DAILY
COMMUNITY
Start: 2025-03-24

## 2025-03-24 RX ORDER — LEVONORGESTREL/ETHIN.ESTRADIOL 0.1-0.02MG
1 TABLET ORAL DAILY
Qty: 84 TABLET | Refills: 4 | Status: SHIPPED | OUTPATIENT
Start: 2025-03-24

## 2025-03-24 SDOH — HEALTH STABILITY: PHYSICAL HEALTH: ON AVERAGE, HOW MANY DAYS PER WEEK DO YOU ENGAGE IN MODERATE TO STRENUOUS EXERCISE (LIKE A BRISK WALK)?: 5 DAYS

## 2025-03-24 SDOH — HEALTH STABILITY: PHYSICAL HEALTH: ON AVERAGE, HOW MANY MINUTES DO YOU ENGAGE IN EXERCISE AT THIS LEVEL?: 50 MIN

## 2025-03-24 ASSESSMENT — SOCIAL DETERMINANTS OF HEALTH (SDOH): HOW OFTEN DO YOU GET TOGETHER WITH FRIENDS OR RELATIVES?: TWICE A WEEK

## 2025-03-24 ASSESSMENT — PAIN SCALES - GENERAL: PAINLEVEL_OUTOF10: NO PAIN (0)

## 2025-03-24 NOTE — PATIENT INSTRUCTIONS
Patient Education   Preventive Care Advice   This is general advice given by our system to help you stay healthy. However, your care team may have specific advice just for you. Please talk to your care team about your preventive care needs.  Nutrition  Eat 5 or more servings of fruits and vegetables each day.  Try wheat bread, brown rice and whole grain pasta (instead of white bread, rice, and pasta).  Get enough calcium and vitamin D. Check the label on foods and aim for 100% of the RDA (recommended daily allowance).  Lifestyle  Exercise at least 150 minutes each week  (30 minutes a day, 5 days a week).  Do muscle strengthening activities 2 days a week. These help control your weight and prevent disease.  No smoking.  Wear sunscreen to prevent skin cancer.  Have a dental exam and cleaning every 6 months.  Yearly exams  See your health care team every year to talk about:  Any changes in your health.  Any medicines your care team has prescribed.  Preventive care, family planning, and ways to prevent chronic diseases.  Shots (vaccines)   HPV shots (up to age 26), if you've never had them before.  Hepatitis B shots (up to age 59), if you've never had them before.  COVID-19 shot: Get this shot when it's due.  Flu shot: Get a flu shot every year.  Tetanus shot: Get a tetanus shot every 10 years.  Pneumococcal, hepatitis A, and RSV shots: Ask your care team if you need these based on your risk.  Shingles shot (for age 50 and up)  General health tests  Diabetes screening:  Starting at age 35, Get screened for diabetes at least every 3 years.  If you are younger than age 35, ask your care team if you should be screened for diabetes.  Cholesterol test: At age 39, start having a cholesterol test every 5 years, or more often if advised.  Bone density scan (DEXA): At age 50, ask your care team if you should have this scan for osteoporosis (brittle bones).  Hepatitis C: Get tested at least once in your life.  STIs (sexually  transmitted infections)  Before age 24: Ask your care team if you should be screened for STIs.  After age 24: Get screened for STIs if you're at risk. You are at risk for STIs (including HIV) if:  You are sexually active with more than one person.  You don't use condoms every time.  You or a partner was diagnosed with a sexually transmitted infection.  If you are at risk for HIV, ask about PrEP medicine to prevent HIV.  Get tested for HIV at least once in your life, whether you are at risk for HIV or not.  Cancer screening tests  Cervical cancer screening: If you have a cervix, begin getting regular cervical cancer screening tests starting at age 21.  Breast cancer scan (mammogram): If you've ever had breasts, begin having regular mammograms starting at age 40. This is a scan to check for breast cancer.  Colon cancer screening: It is important to start screening for colon cancer at age 45.  Have a colonoscopy test every 10 years (or more often if you're at risk) Or, ask your provider about stool tests like a FIT test every year or Cologuard test every 3 years.  To learn more about your testing options, visit:   .  For help making a decision, visit:   https://bit.ly/ix02039.  Prostate cancer screening test: If you have a prostate, ask your care team if a prostate cancer screening test (PSA) at age 55 is right for you.  Lung cancer screening: If you are a current or former smoker ages 50 to 80, ask your care team if ongoing lung cancer screenings are right for you.  For informational purposes only. Not to replace the advice of your health care provider. Copyright   2023 [employer]. All rights reserved. Clinically reviewed by the [facility]itions Program. RF nano [id number] - REV 01/24.

## 2025-03-24 NOTE — PROGRESS NOTES
Preventive Care Visit  [facility]  Brianna JENKINSNaomie Washington, NP, Nurse Practitioner - Family  Mar 24, 2025      1. Routine general medical examination at a health care facility (Primary)    UTD on Pap  Mammo scheduled for May  Colon cancer screening due at 45    2. Family history of breast cancer    Mom with history of breast cancer. Referral placed.     - Adult Oncology/Hematology  Referral; Future    3. Need for influenza vaccination    - INFLUENZA VACCINE,SPLIT VIRUS,TRIVALENT,PF(FLUZONE)    4. Need for COVID-19 vaccine    - COVID-19 12+ (PFIZER)    5. Annual physical exam    - REVIEW OF HEALTH MAINTENANCE PROTOCOL ORDERS    6. Encounter for surveillance of contraceptive pills    - levonorgestrel-ethinyl estradiol (LARISSIA) 0.1-20 MG-MCG tablet; Take 1 tablet by mouth daily.  Dispense: 84 tablet; Refill: 4      Following up with genetic testing  Subjective   Brianna is a 44 year old, presenting for the following:  Physical        3/24/2025     3:50 PM   Additional Questions   Roomed by Jenny OSMAN    44 year old female with a history of depressive, tinnitus, and childhood asthma. She is overall doing well. She has no acute concerns today.     Advance Care Planning  Patient does not have a Health Care Directive: Discussed advance care planning with patient; information given to patient to review.      3/24/2025   General Health   How would you rate your overall physical health? Excellent   Feel stress (tense, anxious, or unable to sleep) Only a little   (!) STRESS CONCERN      3/24/2025   Nutrition   Three or more servings of calcium each day? Yes   Diet: Vegetarian/vegan   How many servings of fruit and vegetables per day? 4 or more   How many sweetened beverages each day? 0-1         3/24/2025   Exercise   Days per week of moderate/strenous exercise 5 days   Average minutes spent exercising at this level 50 min         3/24/2025   Social Factors   Frequency of  gathering with friends or relatives Twice a week   Worry food won't last until get money to buy more No   Food not last or not have enough money for food? No   Do you have housing? (Housing is defined as stable permanent housing and does not include staying ouside in a car, in a tent, in an abandoned building, in an overnight shelter, or couch-surfing.) Yes   Are you worried about losing your housing? No   Lack of transportation? No   Unable to get utilities (heat,electricity)? No         3/24/2025   Dental   Dentist two times every year? Yes           Today's PHQ-2 Score:       3/24/2025     3:44 PM   PHQ-2 ( 1999 Pfizer)   Q1: Little interest or pleasure in doing things 0   Q2: Feeling down, depressed or hopeless 0   PHQ-2 Score 0    Q1: Little interest or pleasure in doing things Not at all   Q2: Feeling down, depressed or hopeless Not at all   PHQ-2 Score 0       Patient-reported           3/24/2025   Substance Use   Alcohol more than 3/day or more than 7/wk No   Do you use any other substances recreationally? No     Social History     Tobacco Use    Smoking status: Never    Smokeless tobacco: Never   Vaping Use    Vaping status: Never Used   Substance Use Topics    Alcohol use: Yes     Comment: Minimal, 1-4 drinks month    Drug use: No           5/14/2024   LAST FHS-7 RESULTS   1st degree relative breast or ovarian cancer Yes   Any relative bilateral breast cancer No   Any male have breast cancer No   Any ONE woman have BOTH breast AND ovarian cancer No   Any woman with breast cancer before 50yrs Yes   2 or more relatives with breast AND/OR ovarian cancer No   2 or more relatives with breast AND/OR bowel cancer Yes        Mammogram Screening - Mammogram every 1-2 years updated in Health Maintenance based on mutual decision making        3/24/2025   STI Screening   New sexual partner(s) since last STI/HIV test? No     History of abnormal Pap smear: No - age 30-64 HPV with reflex Pap every 5 years  "recommended        Latest Ref Rng & Units 11/17/2021     2:53 PM 5/22/2018    10:41 AM 4/27/2015    12:00 AM   PAP / HPV   PAP  Negative for Intraepithelial Lesion or Malignancy (NILM)      PAP (Historical)   NIL  NIL    HPV 16 DNA Negative Negative  Negative     HPV 18 DNA Negative Negative  Negative     Other HR HPV Negative Negative  Negative       ASCVD Risk   The 10-year ASCVD risk score (Abby COVARRUBIAS, et al., 2019) is: 0.4%    Values used to calculate the score:      Age: 44 years      Sex: Female      Is Non- : No      Diabetic: No      Tobacco smoker: No      Systolic Blood Pressure: 100 mmHg      Is BP treated: No      HDL Cholesterol: 52 mg/dL      Total Cholesterol: 175 mg/dL        3/24/2025   Contraception/Family Planning   Questions about contraception or family planning No        Reviewed and updated as needed this visit by Provider                          Review of Systems  Constitutional, HEENT, cardiovascular, pulmonary, gi and gu systems are negative, except as otherwise noted.     Objective    Exam  /68 (BP Location: Right arm, Patient Position: Sitting, Cuff Size: Adult Regular)   Pulse 58   Temp 97.2  F (36.2  C) (Temporal)   Resp 16   Ht 1.575 m (5' 2\")   Wt 72.2 kg (159 lb 1.6 oz)   LMP 03/03/2025 (Approximate)   SpO2 99%   BMI 29.10 kg/m     Estimated body mass index is 29.1 kg/m  as calculated from the following:    Height as of this encounter: 1.575 m (5' 2\").    Weight as of this encounter: 72.2 kg (159 lb 1.6 oz).    Physical Exam  GENERAL: alert and no distress  EYES: Eyes grossly normal to inspection, PERRL and conjunctivae and sclerae normal  HENT: ear canals and TM's normal, nose and mouth without ulcers or lesions  NECK: no adenopathy, no asymmetry, masses, or scars  RESP: lungs clear to auscultation - no rales, rhonchi or wheezes  BREAST: normal without masses, tenderness or nipple discharge and no palpable axillary masses or " adenopathy  CV: regular rate and rhythm, normal S1 S2, no S3 or S4, no murmur, click or rub, no peripheral edema  ABDOMEN: soft, nontender, no hepatosplenomegaly, no masses and bowel sounds normal  MS: no gross musculoskeletal defects noted, no edema  SKIN: no suspicious lesions or rashes  NEURO: Normal strength and tone, mentation intact and speech normal  PSYCH: mentation appears normal, affect normal/bright    Alison Santos, Student Nurse Practitioner   Signed Electronically by: Brianna Washington NP

## 2025-03-25 ENCOUNTER — PATIENT OUTREACH (OUTPATIENT)
Dept: ONCOLOGY | Facility: CLINIC | Age: 45
End: 2025-03-25
Payer: COMMERCIAL

## 2025-03-25 NOTE — PROGRESS NOTES
Writer received referral to Cancer Risk Management/Genetic Counseling.    Referred for:    Family history of breast cancer    Referred By    Provider Department Location Phone   Brianna Washington, NP [facility] [phone]     Referral reviewed for appropriate plan, and sent to New Patient Scheduling ([phone]) for completion.    Briana Cedillo, RN, BSN  Oncology New Patient Nurse Navigator   [facility]

## 2025-05-16 ENCOUNTER — ANCILLARY PROCEDURE (OUTPATIENT)
Dept: MAMMOGRAPHY | Facility: CLINIC | Age: 45
End: 2025-05-16
Attending: NURSE PRACTITIONER
Payer: COMMERCIAL

## 2025-05-16 DIAGNOSIS — Z12.31 VISIT FOR SCREENING MAMMOGRAM: ICD-10-CM

## 2025-05-21 ENCOUNTER — TRANSFERRED RECORDS (OUTPATIENT)
Dept: MULTI SPECIALTY CLINIC | Facility: CLINIC | Age: 45
End: 2025-05-21
Payer: COMMERCIAL

## 2025-09-02 ENCOUNTER — E-VISIT (OUTPATIENT)
Dept: FAMILY MEDICINE | Facility: CLINIC | Age: 45
End: 2025-09-02
Payer: COMMERCIAL

## 2025-09-02 DIAGNOSIS — M54.42 LEFT-SIDED LOW BACK PAIN WITH LEFT-SIDED SCIATICA, UNSPECIFIED CHRONICITY: ICD-10-CM

## 2025-09-02 RX ORDER — TIZANIDINE 2 MG/1
2-4 TABLET ORAL 3 TIMES DAILY PRN
Qty: 30 TABLET | Refills: 1 | Status: SHIPPED | OUTPATIENT
Start: 2025-09-02